# Patient Record
Sex: MALE | Race: WHITE | NOT HISPANIC OR LATINO | Employment: OTHER | URBAN - METROPOLITAN AREA
[De-identification: names, ages, dates, MRNs, and addresses within clinical notes are randomized per-mention and may not be internally consistent; named-entity substitution may affect disease eponyms.]

---

## 2017-01-06 ENCOUNTER — HOSPITAL ENCOUNTER (OUTPATIENT)
Facility: MEDICAL CENTER | Age: 62
End: 2017-01-06
Attending: PAIN MEDICINE | Admitting: PAIN MEDICINE
Payer: COMMERCIAL

## 2017-03-21 ENCOUNTER — HOSPITAL ENCOUNTER (OUTPATIENT)
Dept: LAB | Facility: MEDICAL CENTER | Age: 62
End: 2017-03-21
Attending: ANESTHESIOLOGY
Payer: COMMERCIAL

## 2017-03-21 LAB
ANION GAP SERPL CALC-SCNC: 10 MMOL/L (ref 0–11.9)
APTT PPP: 27.1 SEC (ref 24.7–36)
BUN SERPL-MCNC: 14 MG/DL (ref 8–22)
CALCIUM SERPL-MCNC: 9.7 MG/DL (ref 8.5–10.5)
CHLORIDE SERPL-SCNC: 102 MMOL/L (ref 96–112)
CO2 SERPL-SCNC: 24 MMOL/L (ref 20–33)
CREAT SERPL-MCNC: 0.67 MG/DL (ref 0.5–1.4)
ERYTHROCYTE [DISTWIDTH] IN BLOOD BY AUTOMATED COUNT: 48.9 FL (ref 35.9–50)
GLUCOSE SERPL-MCNC: 100 MG/DL (ref 65–99)
HCT VFR BLD AUTO: 49.1 % (ref 42–52)
HGB BLD-MCNC: 16.7 G/DL (ref 14–18)
INR PPP: 0.86 (ref 0.87–1.13)
MCH RBC QN AUTO: 31.6 PG (ref 27–33)
MCHC RBC AUTO-ENTMCNC: 34 G/DL (ref 33.7–35.3)
MCV RBC AUTO: 93 FL (ref 81.4–97.8)
PLATELET # BLD AUTO: 287 K/UL (ref 164–446)
PMV BLD AUTO: 10.5 FL (ref 9–12.9)
POTASSIUM SERPL-SCNC: 4.1 MMOL/L (ref 3.6–5.5)
PROTHROMBIN TIME: 12 SEC (ref 12–14.6)
RBC # BLD AUTO: 5.28 M/UL (ref 4.7–6.1)
SODIUM SERPL-SCNC: 136 MMOL/L (ref 135–145)
WBC # BLD AUTO: 13.9 K/UL (ref 4.8–10.8)

## 2017-03-21 PROCEDURE — 85027 COMPLETE CBC AUTOMATED: CPT

## 2017-03-21 PROCEDURE — 85730 THROMBOPLASTIN TIME PARTIAL: CPT

## 2017-03-21 PROCEDURE — 85610 PROTHROMBIN TIME: CPT

## 2017-03-21 PROCEDURE — 36415 COLL VENOUS BLD VENIPUNCTURE: CPT

## 2017-03-21 PROCEDURE — 80048 BASIC METABOLIC PNL TOTAL CA: CPT

## 2017-08-01 ENCOUNTER — APPOINTMENT (OUTPATIENT)
Dept: RADIOLOGY | Facility: MEDICAL CENTER | Age: 62
End: 2017-08-01
Attending: NURSE PRACTITIONER
Payer: COMMERCIAL

## 2017-08-09 ENCOUNTER — HOSPITAL ENCOUNTER (OUTPATIENT)
Dept: RADIOLOGY | Facility: MEDICAL CENTER | Age: 62
End: 2017-08-09
Attending: NURSE PRACTITIONER
Payer: COMMERCIAL

## 2017-08-09 DIAGNOSIS — G54.6 PHANTOM PAIN (HCC): ICD-10-CM

## 2017-08-09 PROCEDURE — 72081 X-RAY EXAM ENTIRE SPI 1 VW: CPT

## 2017-10-16 ENCOUNTER — APPOINTMENT (OUTPATIENT)
Dept: RADIOLOGY | Facility: MEDICAL CENTER | Age: 62
DRG: 246 | End: 2017-10-16
Attending: EMERGENCY MEDICINE
Payer: MEDICARE

## 2017-10-16 ENCOUNTER — RESOLUTE PROFESSIONAL BILLING HOSPITAL PROF FEE (OUTPATIENT)
Dept: HOSPITALIST | Facility: MEDICAL CENTER | Age: 62
End: 2017-10-16
Payer: MEDICARE

## 2017-10-16 ENCOUNTER — HOSPITAL ENCOUNTER (INPATIENT)
Facility: MEDICAL CENTER | Age: 62
LOS: 2 days | DRG: 246 | End: 2017-10-18
Attending: EMERGENCY MEDICINE | Admitting: INTERNAL MEDICINE
Payer: MEDICARE

## 2017-10-16 DIAGNOSIS — I21.01 ST ELEVATION MYOCARDIAL INFARCTION INVOLVING LEFT MAIN CORONARY ARTERY (HCC): ICD-10-CM

## 2017-10-16 DIAGNOSIS — I21.09 ANTERIOR MYOCARDIAL INFARCTION (HCC): ICD-10-CM

## 2017-10-16 PROBLEM — Z72.0 NICOTINE ABUSE: Status: ACTIVE | Noted: 2017-10-16

## 2017-10-16 PROBLEM — I21.3 STEMI (ST ELEVATION MYOCARDIAL INFARCTION) (HCC): Status: ACTIVE | Noted: 2017-10-16

## 2017-10-16 PROBLEM — G89.4 CHRONIC PAIN SYNDROME: Status: ACTIVE | Noted: 2017-10-16

## 2017-10-16 PROBLEM — D72.829 LEUCOCYTOSIS: Status: ACTIVE | Noted: 2017-10-16

## 2017-10-16 LAB
ALBUMIN SERPL BCP-MCNC: 3.9 G/DL (ref 3.2–4.9)
ALBUMIN/GLOB SERPL: 1.3 G/DL
ALP SERPL-CCNC: 127 U/L (ref 30–99)
ALT SERPL-CCNC: 14 U/L (ref 2–50)
ANION GAP SERPL CALC-SCNC: 10 MMOL/L (ref 0–11.9)
APTT PPP: 24.6 SEC (ref 24.7–36)
AST SERPL-CCNC: 20 U/L (ref 12–45)
BASOPHILS # BLD AUTO: 0.5 % (ref 0–1.8)
BASOPHILS # BLD: 0.1 K/UL (ref 0–0.12)
BILIRUB SERPL-MCNC: 0.4 MG/DL (ref 0.1–1.5)
BNP SERPL-MCNC: 16 PG/ML (ref 0–100)
BUN SERPL-MCNC: 12 MG/DL (ref 8–22)
CALCIUM SERPL-MCNC: 9 MG/DL (ref 8.5–10.5)
CHLORIDE SERPL-SCNC: 101 MMOL/L (ref 96–112)
CO2 SERPL-SCNC: 25 MMOL/L (ref 20–33)
CREAT SERPL-MCNC: 0.79 MG/DL (ref 0.5–1.4)
EKG IMPRESSION: NORMAL
EKG IMPRESSION: NORMAL
EOSINOPHIL # BLD AUTO: 0.35 K/UL (ref 0–0.51)
EOSINOPHIL NFR BLD: 1.8 % (ref 0–6.9)
ERYTHROCYTE [DISTWIDTH] IN BLOOD BY AUTOMATED COUNT: 46.5 FL (ref 35.9–50)
GFR SERPL CREATININE-BSD FRML MDRD: >60 ML/MIN/1.73 M 2
GLOBULIN SER CALC-MCNC: 3 G/DL (ref 1.9–3.5)
GLUCOSE SERPL-MCNC: 135 MG/DL (ref 65–99)
HCT VFR BLD AUTO: 46.5 % (ref 42–52)
HGB BLD-MCNC: 16.5 G/DL (ref 14–18)
IMM GRANULOCYTES # BLD AUTO: 0.12 K/UL (ref 0–0.11)
IMM GRANULOCYTES NFR BLD AUTO: 0.6 % (ref 0–0.9)
INR PPP: 0.96 (ref 0.87–1.13)
LIPASE SERPL-CCNC: 17 U/L (ref 11–82)
LYMPHOCYTES # BLD AUTO: 2.6 K/UL (ref 1–4.8)
LYMPHOCYTES NFR BLD: 13.3 % (ref 22–41)
MCH RBC QN AUTO: 32.9 PG (ref 27–33)
MCHC RBC AUTO-ENTMCNC: 35.5 G/DL (ref 33.7–35.3)
MCV RBC AUTO: 92.8 FL (ref 81.4–97.8)
MONOCYTES # BLD AUTO: 1.19 K/UL (ref 0–0.85)
MONOCYTES NFR BLD AUTO: 6.1 % (ref 0–13.4)
NEUTROPHILS # BLD AUTO: 15.23 K/UL (ref 1.82–7.42)
NEUTROPHILS NFR BLD: 77.7 % (ref 44–72)
NRBC # BLD AUTO: 0 K/UL
NRBC BLD AUTO-RTO: 0 /100 WBC
PLATELET # BLD AUTO: 288 K/UL (ref 164–446)
PMV BLD AUTO: 10 FL (ref 9–12.9)
POTASSIUM SERPL-SCNC: 3.9 MMOL/L (ref 3.6–5.5)
PROT SERPL-MCNC: 6.9 G/DL (ref 6–8.2)
PROTHROMBIN TIME: 13.1 SEC (ref 12–14.6)
RBC # BLD AUTO: 5.01 M/UL (ref 4.7–6.1)
SODIUM SERPL-SCNC: 136 MMOL/L (ref 135–145)
TROPONIN I SERPL-MCNC: 0.03 NG/ML (ref 0–0.04)
TROPONIN I SERPL-MCNC: 13.61 NG/ML (ref 0–0.04)
WBC # BLD AUTO: 19.6 K/UL (ref 4.8–10.8)

## 2017-10-16 PROCEDURE — C1894 INTRO/SHEATH, NON-LASER: HCPCS

## 2017-10-16 PROCEDURE — 93458 L HRT ARTERY/VENTRICLE ANGIO: CPT

## 2017-10-16 PROCEDURE — 85025 COMPLETE CBC W/AUTO DIFF WBC: CPT

## 2017-10-16 PROCEDURE — 80053 COMPREHEN METABOLIC PANEL: CPT

## 2017-10-16 PROCEDURE — 99291 CRITICAL CARE FIRST HOUR: CPT

## 2017-10-16 PROCEDURE — 99152 MOD SED SAME PHYS/QHP 5/>YRS: CPT

## 2017-10-16 PROCEDURE — 770022 HCHG ROOM/CARE - ICU (200)

## 2017-10-16 PROCEDURE — 84484 ASSAY OF TROPONIN QUANT: CPT

## 2017-10-16 PROCEDURE — 700111 HCHG RX REV CODE 636 W/ 250 OVERRIDE (IP)

## 2017-10-16 PROCEDURE — 700101 HCHG RX REV CODE 250

## 2017-10-16 PROCEDURE — 700117 HCHG RX CONTRAST REV CODE 255: Performed by: INTERNAL MEDICINE

## 2017-10-16 PROCEDURE — 304952 HCHG R 2 PADS

## 2017-10-16 PROCEDURE — 93306 TTE W/DOPPLER COMPLETE: CPT | Mod: 26 | Performed by: INTERNAL MEDICINE

## 2017-10-16 PROCEDURE — C1725 CATH, TRANSLUMIN NON-LASER: HCPCS

## 2017-10-16 PROCEDURE — A9270 NON-COVERED ITEM OR SERVICE: HCPCS

## 2017-10-16 PROCEDURE — 96375 TX/PRO/DX INJ NEW DRUG ADDON: CPT

## 2017-10-16 PROCEDURE — B2151ZZ FLUOROSCOPY OF LEFT HEART USING LOW OSMOLAR CONTRAST: ICD-10-PCS | Performed by: INTERNAL MEDICINE

## 2017-10-16 PROCEDURE — 700111 HCHG RX REV CODE 636 W/ 250 OVERRIDE (IP): Performed by: INTERNAL MEDICINE

## 2017-10-16 PROCEDURE — 99153 MOD SED SAME PHYS/QHP EA: CPT

## 2017-10-16 PROCEDURE — 360979 HCHG DIAGNOSTIC CATH

## 2017-10-16 PROCEDURE — C1887 CATHETER, GUIDING: HCPCS

## 2017-10-16 PROCEDURE — 96374 THER/PROPH/DIAG INJ IV PUSH: CPT

## 2017-10-16 PROCEDURE — 94640 AIRWAY INHALATION TREATMENT: CPT

## 2017-10-16 PROCEDURE — A9270 NON-COVERED ITEM OR SERVICE: HCPCS | Performed by: INTERNAL MEDICINE

## 2017-10-16 PROCEDURE — 700102 HCHG RX REV CODE 250 W/ 637 OVERRIDE(OP): Performed by: HOSPITALIST

## 2017-10-16 PROCEDURE — 93005 ELECTROCARDIOGRAM TRACING: CPT | Performed by: INTERNAL MEDICINE

## 2017-10-16 PROCEDURE — 36415 COLL VENOUS BLD VENIPUNCTURE: CPT

## 2017-10-16 PROCEDURE — C1874 STENT, COATED/COV W/DEL SYS: HCPCS

## 2017-10-16 PROCEDURE — 85730 THROMBOPLASTIN TIME PARTIAL: CPT

## 2017-10-16 PROCEDURE — A9270 NON-COVERED ITEM OR SERVICE: HCPCS | Performed by: HOSPITALIST

## 2017-10-16 PROCEDURE — 93306 TTE W/DOPPLER COMPLETE: CPT

## 2017-10-16 PROCEDURE — 700102 HCHG RX REV CODE 250 W/ 637 OVERRIDE(OP)

## 2017-10-16 PROCEDURE — 83880 ASSAY OF NATRIURETIC PEPTIDE: CPT

## 2017-10-16 PROCEDURE — 700105 HCHG RX REV CODE 258: Performed by: INTERNAL MEDICINE

## 2017-10-16 PROCEDURE — 307093 HCHG TR BAND RADIAL

## 2017-10-16 PROCEDURE — 4A023N7 MEASUREMENT OF CARDIAC SAMPLING AND PRESSURE, LEFT HEART, PERCUTANEOUS APPROACH: ICD-10-PCS | Performed by: INTERNAL MEDICINE

## 2017-10-16 PROCEDURE — 71010 DX-CHEST-LIMITED (1 VIEW): CPT

## 2017-10-16 PROCEDURE — 99223 1ST HOSP IP/OBS HIGH 75: CPT | Mod: AI | Performed by: INTERNAL MEDICINE

## 2017-10-16 PROCEDURE — 93005 ELECTROCARDIOGRAM TRACING: CPT | Performed by: EMERGENCY MEDICINE

## 2017-10-16 PROCEDURE — 93010 ELECTROCARDIOGRAM REPORT: CPT | Performed by: INTERNAL MEDICINE

## 2017-10-16 PROCEDURE — 700105 HCHG RX REV CODE 258

## 2017-10-16 PROCEDURE — 85610 PROTHROMBIN TIME: CPT

## 2017-10-16 PROCEDURE — 94760 N-INVAS EAR/PLS OXIMETRY 1: CPT

## 2017-10-16 PROCEDURE — C1769 GUIDE WIRE: HCPCS

## 2017-10-16 PROCEDURE — C9606 PERC D-E COR REVASC W AMI S: HCPCS | Mod: LD

## 2017-10-16 PROCEDURE — 0270346 DILATION OF CORONARY ARTERY, ONE ARTERY, BIFURCATION, WITH DRUG-ELUTING INTRALUMINAL DEVICE, PERCUTANEOUS APPROACH: ICD-10-PCS | Performed by: INTERNAL MEDICINE

## 2017-10-16 PROCEDURE — B2111ZZ FLUOROSCOPY OF MULTIPLE CORONARY ARTERIES USING LOW OSMOLAR CONTRAST: ICD-10-PCS | Performed by: INTERNAL MEDICINE

## 2017-10-16 PROCEDURE — 83690 ASSAY OF LIPASE: CPT

## 2017-10-16 PROCEDURE — 700102 HCHG RX REV CODE 250 W/ 637 OVERRIDE(OP): Performed by: INTERNAL MEDICINE

## 2017-10-16 RX ORDER — HEPARIN SODIUM,PORCINE 1000/ML
VIAL (ML) INJECTION
Status: COMPLETED
Start: 2017-10-16 | End: 2017-10-16

## 2017-10-16 RX ORDER — ONDANSETRON 2 MG/ML
INJECTION INTRAMUSCULAR; INTRAVENOUS
Status: COMPLETED
Start: 2017-10-16 | End: 2017-10-16

## 2017-10-16 RX ORDER — MORPHINE SULFATE 4 MG/ML
INJECTION, SOLUTION INTRAMUSCULAR; INTRAVENOUS
Status: COMPLETED
Start: 2017-10-16 | End: 2017-10-16

## 2017-10-16 RX ORDER — ONDANSETRON 4 MG/1
4 TABLET, ORALLY DISINTEGRATING ORAL EVERY 4 HOURS PRN
Status: DISCONTINUED | OUTPATIENT
Start: 2017-10-16 | End: 2017-10-18 | Stop reason: HOSPADM

## 2017-10-16 RX ORDER — MIDAZOLAM HYDROCHLORIDE 1 MG/ML
INJECTION INTRAMUSCULAR; INTRAVENOUS
Status: COMPLETED
Start: 2017-10-16 | End: 2017-10-16

## 2017-10-16 RX ORDER — NITROGLYCERIN 0.4 MG/1
0.4 TABLET SUBLINGUAL
Status: DISCONTINUED | OUTPATIENT
Start: 2017-10-16 | End: 2017-10-18 | Stop reason: HOSPADM

## 2017-10-16 RX ORDER — SODIUM CHLORIDE 9 MG/ML
INJECTION, SOLUTION INTRAVENOUS CONTINUOUS
Status: DISCONTINUED | OUTPATIENT
Start: 2017-10-16 | End: 2017-10-16

## 2017-10-16 RX ORDER — MORPHINE SULFATE 4 MG/ML
2-4 INJECTION, SOLUTION INTRAMUSCULAR; INTRAVENOUS
Status: DISCONTINUED | OUTPATIENT
Start: 2017-10-16 | End: 2017-10-18 | Stop reason: HOSPADM

## 2017-10-16 RX ORDER — ASPIRIN 300 MG/1
300 SUPPOSITORY RECTAL DAILY
Status: DISCONTINUED | OUTPATIENT
Start: 2017-10-16 | End: 2017-10-18

## 2017-10-16 RX ORDER — PRASUGREL 10 MG/1
TABLET, FILM COATED ORAL
Status: COMPLETED
Start: 2017-10-16 | End: 2017-10-16

## 2017-10-16 RX ORDER — ASPIRIN 325 MG
325 TABLET ORAL DAILY
Status: DISCONTINUED | OUTPATIENT
Start: 2017-10-16 | End: 2017-10-18

## 2017-10-16 RX ORDER — MORPHINE SULFATE 15 MG/1
15 TABLET, FILM COATED, EXTENDED RELEASE ORAL
Status: DISCONTINUED | OUTPATIENT
Start: 2017-10-16 | End: 2017-10-18 | Stop reason: HOSPADM

## 2017-10-16 RX ORDER — AMIODARONE HYDROCHLORIDE 150 MG/3ML
INJECTION, SOLUTION INTRAVENOUS
Status: COMPLETED
Start: 2017-10-16 | End: 2017-10-16

## 2017-10-16 RX ORDER — CARVEDILOL 6.25 MG/1
6.25 TABLET ORAL 2 TIMES DAILY WITH MEALS
Status: DISCONTINUED | OUTPATIENT
Start: 2017-10-16 | End: 2017-10-18 | Stop reason: HOSPADM

## 2017-10-16 RX ORDER — HEPARIN SODIUM 5000 [USP'U]/ML
5000 INJECTION, SOLUTION INTRAVENOUS; SUBCUTANEOUS EVERY 8 HOURS
Status: DISCONTINUED | OUTPATIENT
Start: 2017-10-16 | End: 2017-10-18 | Stop reason: HOSPADM

## 2017-10-16 RX ORDER — NICOTINE 21 MG/24HR
21 PATCH, TRANSDERMAL 24 HOURS TRANSDERMAL
Status: DISCONTINUED | OUTPATIENT
Start: 2017-10-16 | End: 2017-10-18 | Stop reason: HOSPADM

## 2017-10-16 RX ORDER — PROMETHAZINE HYDROCHLORIDE 12.5 MG/1
12.5-25 SUPPOSITORY RECTAL EVERY 4 HOURS PRN
Status: DISCONTINUED | OUTPATIENT
Start: 2017-10-16 | End: 2017-10-18 | Stop reason: HOSPADM

## 2017-10-16 RX ORDER — ONDANSETRON 2 MG/ML
4 INJECTION INTRAMUSCULAR; INTRAVENOUS EVERY 4 HOURS PRN
Status: DISCONTINUED | OUTPATIENT
Start: 2017-10-16 | End: 2017-10-16

## 2017-10-16 RX ORDER — METOPROLOL SUCCINATE 50 MG/1
50 TABLET, EXTENDED RELEASE ORAL
Status: CANCELLED | OUTPATIENT
Start: 2017-10-16

## 2017-10-16 RX ORDER — BUDESONIDE AND FORMOTEROL FUMARATE DIHYDRATE 160; 4.5 UG/1; UG/1
2 AEROSOL RESPIRATORY (INHALATION) 2 TIMES DAILY
Status: DISCONTINUED | OUTPATIENT
Start: 2017-10-16 | End: 2017-10-18 | Stop reason: HOSPADM

## 2017-10-16 RX ORDER — VERAPAMIL HYDROCHLORIDE 2.5 MG/ML
INJECTION, SOLUTION INTRAVENOUS
Status: COMPLETED
Start: 2017-10-16 | End: 2017-10-16

## 2017-10-16 RX ORDER — PRASUGREL 10 MG/1
10 TABLET, FILM COATED ORAL DAILY
Status: DISCONTINUED | OUTPATIENT
Start: 2017-10-17 | End: 2017-10-18 | Stop reason: HOSPADM

## 2017-10-16 RX ORDER — MORPHINE SULFATE 4 MG/ML
4 INJECTION, SOLUTION INTRAMUSCULAR; INTRAVENOUS ONCE
Status: COMPLETED | OUTPATIENT
Start: 2017-10-16 | End: 2017-10-16

## 2017-10-16 RX ORDER — OXYCODONE HYDROCHLORIDE 5 MG/1
5 TABLET ORAL EVERY 4 HOURS PRN
Status: DISCONTINUED | OUTPATIENT
Start: 2017-10-16 | End: 2017-10-16

## 2017-10-16 RX ORDER — ATORVASTATIN CALCIUM 20 MG/1
40 TABLET, FILM COATED ORAL EVERY EVENING
Status: CANCELLED | OUTPATIENT
Start: 2017-10-16

## 2017-10-16 RX ORDER — ACETAMINOPHEN 325 MG/1
650 TABLET ORAL EVERY 6 HOURS PRN
Status: DISCONTINUED | OUTPATIENT
Start: 2017-10-16 | End: 2017-10-18 | Stop reason: HOSPADM

## 2017-10-16 RX ORDER — OXYCODONE HYDROCHLORIDE 10 MG/1
10 TABLET ORAL EVERY 4 HOURS PRN
Status: DISCONTINUED | OUTPATIENT
Start: 2017-10-16 | End: 2017-10-18 | Stop reason: HOSPADM

## 2017-10-16 RX ORDER — BISACODYL 10 MG
10 SUPPOSITORY, RECTAL RECTAL
Status: DISCONTINUED | OUTPATIENT
Start: 2017-10-16 | End: 2017-10-18 | Stop reason: HOSPADM

## 2017-10-16 RX ORDER — NITROGLYCERIN 0.4 MG/1
TABLET SUBLINGUAL
Status: COMPLETED
Start: 2017-10-16 | End: 2017-10-16

## 2017-10-16 RX ORDER — ATORVASTATIN CALCIUM 20 MG/1
80 TABLET, FILM COATED ORAL NIGHTLY
Status: DISCONTINUED | OUTPATIENT
Start: 2017-10-16 | End: 2017-10-18 | Stop reason: HOSPADM

## 2017-10-16 RX ORDER — SPIRONOLACTONE 25 MG/1
12.5 TABLET ORAL
Status: DISCONTINUED | OUTPATIENT
Start: 2017-10-16 | End: 2017-10-18 | Stop reason: HOSPADM

## 2017-10-16 RX ORDER — ASPIRIN 81 MG/1
324 TABLET, CHEWABLE ORAL DAILY
Status: DISCONTINUED | OUTPATIENT
Start: 2017-10-16 | End: 2017-10-18

## 2017-10-16 RX ORDER — SODIUM CHLORIDE 9 MG/ML
INJECTION, SOLUTION INTRAVENOUS CONTINUOUS
Status: DISCONTINUED | OUTPATIENT
Start: 2017-10-16 | End: 2017-10-18 | Stop reason: HOSPADM

## 2017-10-16 RX ORDER — ALBUTEROL SULFATE 90 UG/1
2 AEROSOL, METERED RESPIRATORY (INHALATION) EVERY 4 HOURS PRN
Status: DISCONTINUED | OUTPATIENT
Start: 2017-10-16 | End: 2017-10-18 | Stop reason: HOSPADM

## 2017-10-16 RX ORDER — PRASUGREL 10 MG/1
60 TABLET, FILM COATED ORAL ONCE
Status: COMPLETED | OUTPATIENT
Start: 2017-10-16 | End: 2017-10-16

## 2017-10-16 RX ORDER — AMOXICILLIN 250 MG
2 CAPSULE ORAL 2 TIMES DAILY
Status: DISCONTINUED | OUTPATIENT
Start: 2017-10-16 | End: 2017-10-18 | Stop reason: HOSPADM

## 2017-10-16 RX ORDER — POLYETHYLENE GLYCOL 3350 17 G/17G
1 POWDER, FOR SOLUTION ORAL
Status: DISCONTINUED | OUTPATIENT
Start: 2017-10-16 | End: 2017-10-18 | Stop reason: HOSPADM

## 2017-10-16 RX ORDER — ONDANSETRON 2 MG/ML
4 INJECTION INTRAMUSCULAR; INTRAVENOUS EVERY 4 HOURS PRN
Status: DISCONTINUED | OUTPATIENT
Start: 2017-10-16 | End: 2017-10-18 | Stop reason: HOSPADM

## 2017-10-16 RX ORDER — PROMETHAZINE HYDROCHLORIDE 25 MG/1
12.5-25 TABLET ORAL EVERY 4 HOURS PRN
Status: DISCONTINUED | OUTPATIENT
Start: 2017-10-16 | End: 2017-10-18 | Stop reason: HOSPADM

## 2017-10-16 RX ORDER — LIDOCAINE HYDROCHLORIDE 20 MG/ML
INJECTION, SOLUTION INFILTRATION; PERINEURAL
Status: COMPLETED
Start: 2017-10-16 | End: 2017-10-16

## 2017-10-16 RX ADMIN — NICOTINE 21 MG: 21 PATCH, EXTENDED RELEASE TRANSDERMAL at 11:18

## 2017-10-16 RX ADMIN — MIDAZOLAM 2 MG: 1 INJECTION INTRAMUSCULAR; INTRAVENOUS at 10:03

## 2017-10-16 RX ADMIN — ATORVASTATIN CALCIUM 80 MG: 20 TABLET, FILM COATED ORAL at 21:15

## 2017-10-16 RX ADMIN — MORPHINE SULFATE 15 MG: 15 TABLET, FILM COATED, EXTENDED RELEASE ORAL at 21:15

## 2017-10-16 RX ADMIN — NITROGLYCERIN 10 ML: 20 INJECTION INTRAVENOUS at 10:01

## 2017-10-16 RX ADMIN — OXYCODONE HYDROCHLORIDE 10 MG: 10 TABLET ORAL at 18:05

## 2017-10-16 RX ADMIN — AMIODARONE HYDROCHLORIDE 150 MG: 50 INJECTION, SOLUTION INTRAVENOUS at 10:04

## 2017-10-16 RX ADMIN — BIVALIRUDIN 1.75 MG/KG/HR: 250 INJECTION, POWDER, LYOPHILIZED, FOR SOLUTION INTRAVENOUS at 10:15

## 2017-10-16 RX ADMIN — HEPARIN SODIUM 5000 UNITS: 5000 INJECTION, SOLUTION INTRAVENOUS; SUBCUTANEOUS at 14:28

## 2017-10-16 RX ADMIN — Medication 60 MG: at 10:13

## 2017-10-16 RX ADMIN — NITROGLYCERIN 0.4 MG: 0.4 TABLET SUBLINGUAL at 09:20

## 2017-10-16 RX ADMIN — HEPARIN SODIUM: 1000 INJECTION, SOLUTION INTRAVENOUS; SUBCUTANEOUS at 10:01

## 2017-10-16 RX ADMIN — VERAPAMIL HYDROCHLORIDE 2.5 MG: 2.5 INJECTION, SOLUTION INTRAVENOUS at 10:02

## 2017-10-16 RX ADMIN — MORPHINE SULFATE 4 MG: 4 INJECTION, SOLUTION INTRAMUSCULAR; INTRAVENOUS at 09:34

## 2017-10-16 RX ADMIN — FENTANYL CITRATE 100 MCG: 50 INJECTION, SOLUTION INTRAMUSCULAR; INTRAVENOUS at 10:03

## 2017-10-16 RX ADMIN — ALBUTEROL SULFATE 2.5 MG: 2.5 SOLUTION RESPIRATORY (INHALATION) at 17:10

## 2017-10-16 RX ADMIN — LIDOCAINE HYDROCHLORIDE: 20 INJECTION, SOLUTION INFILTRATION; PERINEURAL at 10:02

## 2017-10-16 RX ADMIN — HEPARIN SODIUM 2000 UNITS: 200 INJECTION, SOLUTION INTRAVENOUS at 10:02

## 2017-10-16 RX ADMIN — HEPARIN SODIUM 5000 UNITS: 5000 INJECTION, SOLUTION INTRAVENOUS; SUBCUTANEOUS at 21:15

## 2017-10-16 RX ADMIN — IOHEXOL 129 ML: 350 INJECTION, SOLUTION INTRAVENOUS at 10:12

## 2017-10-16 RX ADMIN — FENTANYL CITRATE 25 MCG: 50 INJECTION, SOLUTION INTRAMUSCULAR; INTRAVENOUS at 10:06

## 2017-10-16 RX ADMIN — PRASUGREL 60 MG: 10 TABLET, FILM COATED ORAL at 10:13

## 2017-10-16 RX ADMIN — ONDANSETRON 4 MG: 2 INJECTION INTRAMUSCULAR; INTRAVENOUS at 09:20

## 2017-10-16 RX ADMIN — OXYCODONE HYDROCHLORIDE 10 MG: 10 TABLET ORAL at 12:02

## 2017-10-16 RX ADMIN — SODIUM CHLORIDE: 9 INJECTION, SOLUTION INTRAVENOUS at 11:13

## 2017-10-16 RX ADMIN — MORPHINE SULFATE 4 MG: 4 INJECTION INTRAVENOUS at 09:34

## 2017-10-16 RX ADMIN — STANDARDIZED SENNA CONCENTRATE AND DOCUSATE SODIUM 2 TABLET: 8.6; 5 TABLET, FILM COATED ORAL at 21:16

## 2017-10-16 RX ADMIN — MORPHINE SULFATE 4 MG: 4 INJECTION INTRAVENOUS at 09:20

## 2017-10-16 ASSESSMENT — PATIENT HEALTH QUESTIONNAIRE - PHQ9
2. FEELING DOWN, DEPRESSED, IRRITABLE, OR HOPELESS: NOT AT ALL
1. LITTLE INTEREST OR PLEASURE IN DOING THINGS: NOT AT ALL
SUM OF ALL RESPONSES TO PHQ QUESTIONS 1-9: 0
SUM OF ALL RESPONSES TO PHQ9 QUESTIONS 1 AND 2: 0
SUM OF ALL RESPONSES TO PHQ QUESTIONS 1-9: 0
SUM OF ALL RESPONSES TO PHQ9 QUESTIONS 1 AND 2: 0
2. FEELING DOWN, DEPRESSED, IRRITABLE, OR HOPELESS: NOT AT ALL
1. LITTLE INTEREST OR PLEASURE IN DOING THINGS: NOT AT ALL

## 2017-10-16 ASSESSMENT — ENCOUNTER SYMPTOMS
HEADACHES: 0
NERVOUS/ANXIOUS: 0
COUGH: 0
STRIDOR: 0
SHORTNESS OF BREATH: 0
FEVER: 0
ORTHOPNEA: 0
INSOMNIA: 0
BACK PAIN: 0
EYE PAIN: 0
ABDOMINAL PAIN: 0
PALPITATIONS: 0
SPUTUM PRODUCTION: 0
FOCAL WEAKNESS: 0
WEIGHT LOSS: 0
NAUSEA: 1
EYE DISCHARGE: 0
VOMITING: 0
DEPRESSION: 0
NECK PAIN: 0
DIARRHEA: 0
BLURRED VISION: 0
MYALGIAS: 0
DIZZINESS: 0
SEIZURES: 0
HEARTBURN: 0
CHILLS: 0
DIAPHORESIS: 1
EYE REDNESS: 0

## 2017-10-16 ASSESSMENT — COPD QUESTIONNAIRES
DO YOU EVER COUGH UP ANY MUCUS OR PHLEGM?: YES, A FEW DAYS A WEEK OR MONTH
COPD SCREENING SCORE: 7
COPD SCREENING SCORE: 7
DO YOU EVER COUGH UP ANY MUCUS OR PHLEGM?: YES, A FEW DAYS A WEEK OR MONTH
HAVE YOU SMOKED AT LEAST 100 CIGARETTES IN YOUR ENTIRE LIFE: YES
HAVE YOU SMOKED AT LEAST 100 CIGARETTES IN YOUR ENTIRE LIFE: YES
DURING THE PAST 4 WEEKS HOW MUCH DID YOU FEEL SHORT OF BREATH: SOME OF THE TIME
DURING THE PAST 4 WEEKS HOW MUCH DID YOU FEEL SHORT OF BREATH: SOME OF THE TIME

## 2017-10-16 ASSESSMENT — LIFESTYLE VARIABLES
ALCOHOL_USE: NO
EVER_SMOKED: YES
EVER_SMOKED: YES

## 2017-10-16 ASSESSMENT — PAIN SCALES - GENERAL
PAINLEVEL_OUTOF10: 9
PAINLEVEL_OUTOF10: 7
PAINLEVEL_OUTOF10: 0
PAINLEVEL_OUTOF10: 6
PAINLEVEL_OUTOF10: 3
PAINLEVEL_OUTOF10: 5
PAINLEVEL_OUTOF10: 6
PAINLEVEL_OUTOF10: 4
PAINLEVEL_OUTOF10: 3

## 2017-10-16 NOTE — PROGRESS NOTES
Karina from Lab called with critical result of troponin of 13.61 at 1400. Critical lab result read back to Karina.   MD aware, patient s/p cath lab. Patient resting comfortably in bed with no complaints of chest pain at this time. HR 60, /49, 97% 2L NC.

## 2017-10-16 NOTE — ED NOTES
0935 pt in route to cath lab 1. Report given to [facility] gentry badillo. Pt shaved, placed on monitor 2nd iv started, labs collected and sent. Pt medicated prior to leaving.

## 2017-10-16 NOTE — PROCEDURES
REFERRING PHYSICIAN: Dr. Taylor    PREOPERATIVE DIAGNOSIS:  1. Anterior ST elevation myocardial infarction  2. CAD status post multiple PCI in the past  3. Ischemic cardiomyopathy, previous LVEF unknown    POSTOPERATIVE DIAGNOSIS:  1. LVEF33%  2. 99.9% culprit pLAD-D1 (Paz 1,1,1) stenosis, thrombus   3. 60% focal distal RCA stenosis, I asked our  4. Successful PCI LAD 3.5x18mm Xience Alpine ALLA, provisional D1, TIMI3 flow all vessels      PROCEDURE PERFORMED:  Selective coronary angiography of the native vessels  Left heart catheterization  Left ventriculogram  PCI of LAD ALLA    DESCRIPTION OF PROCEDURE:  The risks and benefits of cardiac catheterization as well as the procedure itself, rationale and appropriateness were discussed with the patient today. Complications including but not limited to death, stroke, MI, urgent bypass surgery, contrast nephropathy, vascular complications, bleeding and infection were explained to the patient. The potential outcomes associated with the procedure (possible PCI, possible CABG, possible medical Rx only) were also discussed at length. The patient agreed to proceed.    The patient was transported to the catheterization laboratory in the fasting state. The right radial area and right groin were prepped and draped in the usual fashion. Right radial area was entered with a single through and through puncture and a 6F glide sheath was placed. An intra-arterial cocktail of heparin, verapamil and nitroglycerine was administered. Over a wire, a 6 Lebanese JR4 catheter was passed to the aortic root and used to engage the right coronary artery Contrast was administered and multiple images obtained. This catheter was then used to cross the aortic valve for LHC and contrast was administered at 8cc/s for 24cc's for left ventriculogram. This was exchanged over wire for a 6 Lebanese EBU 3.5 guiding catheter was seated in the left main. Multiple images were obtained.     DESCRIPTION OF  PCI:  The decision was made to intervene on the culprit coronary artery. Bivalirudin bolus and infusion was initiated. A BMW 0.014 floppy tip wire was navigated across the culprit stenos and parked in the distal LAD, followed by a 2nd 0.014 run through wire navigated into the side branch. Over the LAD wire, A 3.0 x 12 mm compliant balloon balloon was used to predilate the lesion. A 3.5 x 15 mm compliant balloon was then used to predilate the lesion further. A 3.5 x 18 mm Xience Alpine ALLA was then positioned and deployed at nominal pressure. Following this a 3.5 x 15 mm NC trek was used to post dilate the stent. Sidebranch wire was removed. CEDRICK-3 flow was restored to all vessels. There was a 80% angiographic ostial stenosis due to jailing the diagonal but residual CEDRICK-3 flow. There was an excellent angiographic result with CEDRICK-3 flow and no residual stenosis in the stented segment. All catheters and guidewires were removed and the patient left the cath lab in stable condition.      FINDINGS:  I. HEMODYNAMICS:    Ao: 155/75 mmHg   LEDP: 35 mmHg   Gradient on LV pullback: No    II. LEFT VENTRICULOGRAM:   LVEF ROSENBERG PROJECTION: 33%     III. CORONARY ANGIOGRAPHY:  Left Main: Moderate size, short, bifurcating no CAD.  Left Anterior Descending: Large with an area of acute stent thrombosis in the proximal vessel involving the 1st diagonal (Paz 1, 1, 1) with significant thrombus and plaque. Postintervention there is 0% residual stenosis in the LAD and pinching of the sidebranch with restoration of CEDRICK-3 flow to all involved vessels.  Left Circumflex: Moderate size nondominant with a widely patent stent in the proximal portion. There is a very large 1st obtuse marginal/ramus which is widely patent.  Right Coronary: Widely patent proximal stent and a dominant vessel. Large in size. There is a focal area of distal in-stent restenosis of 60% with CEDRICK-3 flow.     COMPLICATIONS: none apparent    CONCLUSIONS:  1. LVEF33%  2.  99.9% culprit pLAD-D1 (Paz 1,1,1) stenosis, thrombus   3. 60% focal distal RCA stenosis, I asked our  4. Successful PCI LAD 3.5x18mm Xience Alpine ALLA, provisional D1, TIMI3 flow all vessels

## 2017-10-16 NOTE — H&P
" Hospital Medicine History and Physical      Date of Service  10/16/2017    Chief Complaint  Chief Complaint   Patient presents with   • Chest Pain       History of Presenting Illness  Dedual is a 62 y.o. male PMH of CAD post 4 stents, DLD, HTN who presents with chest pain started around 8:30 am. Over the past 3 weeks, he has been having intermittent chest pain. He continues to smoke 1PPD. But this morning, the pain is so severe that he rated it as 11/10, pressure like, radiated to his left arm and neck area, associated with nausea and diaphoresis. In the ER he was found to have STEMI on ekg. Cardiology was consulted and he was immediately taken to cath lab. He will be admitted to cardiac ICU after the cath.    Primary Care Physician  KATELYN Clark.      Code Status  Full code    Review of Systems  Review of Systems   Constitutional: Positive for diaphoresis. Negative for chills, fever and weight loss.   HENT: Negative for congestion and nosebleeds.    Eyes: Negative for blurred vision, pain, discharge and redness.   Respiratory: Negative for cough, sputum production, shortness of breath and stridor.    Cardiovascular: Positive for chest pain. Negative for palpitations and orthopnea.   Gastrointestinal: Positive for nausea. Negative for abdominal pain, diarrhea, heartburn and vomiting.   Genitourinary: Negative for dysuria, frequency and urgency.   Musculoskeletal: Negative for back pain, myalgias and neck pain.   Skin: Negative for itching and rash.   Neurological: Negative for dizziness, focal weakness, seizures and headaches.   Psychiatric/Behavioral: Negative for depression. The patient is not nervous/anxious and does not have insomnia.      Please see HPI, all other systems were reviewed and are negative (AMA/CMS criteria)     Past Medical History  Past Medical History:   Diagnosis Date   • Asthma    • Bipolar disorder (CMS-HCC)     no meds   • Chronic back pain     6-7/10 when walking   • Cold     \"I " "had a cold 12/8/16 , felt really bad over the weekend, feeling better now\" denies sob, did have a productive cough   • Dental disorder     missing teeth, currently on antibx   • Diverticulitis    • Hemorrhagic disorder (CMS-HCC)     on Plavix   • High cholesterol    • History of back surgery    • Hypertension    • Infectious disease    • MI (myocardial infarction) (CMS-HCC) 2007,01/2015, 02/2015    w/ stents   • Pneumonia     2013   • Sleep apnea     uses CPAP   • Snoring        Surgical History  Past Surgical History:   Procedure Laterality Date   • EXTREME LATERAL INTERBODY FUSION  1/19/2016    Procedure: EXTREME LATERAL INTERBODY FUSION XLIF L1-2 (STAGE 1);  Surgeon: Ricky Jordan M.D.;  Location: SURGERY [facility];  Service:    • LUMBAR LAMINECTOMY DISKECTOMY  1/19/2016    Procedure: LUMBAR LAMINECTOMY DISKECTOMY FOR REDO LAMINOTOMIES L1-2 (STAGE 2);  Surgeon: Ricky Jordan M.D.;  Location: SURGERY [facility];  Service:    • OTHER CARDIAC SURGERY  02/2015    stent placement   • APPENDECTOMY     • OTHER ABDOMINAL SURGERY      colon resection    • OTHER NEUROLOGICAL SURG      x7 lumbar surgs       Medications  No current facility-administered medications on file prior to encounter.      Current Outpatient Prescriptions on File Prior to Encounter   Medication Sig Dispense Refill   • atorvastatin (LIPITOR) 80 MG tablet Take 80 mg by mouth every evening.     • Omega 3 1000 MG Cap Take 1,000 mg by mouth every day.     • oxycodone, immediate release, (ROXICODONE) 5 MG TABS Take 1 Tab by mouth every four hours as needed ((4-6)). (Patient taking differently: Take 5 mg by mouth every 8 hours as needed ((4-6)).) 16 Tab 0   • metoprolol SR (TOPROL XL) 25 MG TB24 Take 2 Tabs by mouth every bedtime. (Patient taking differently: Take 50 mg by mouth 2 Times a Day.) 10 Tab 0   • cholecalciferol (VITAMIN D3) 5000 UNIT CAPS Take 10,000 Units by mouth every day.     • Multiple Vitamins-Minerals (MULTIVITAMIN & MINERAL " PO) Take 1 Tab by mouth every day.       Family History  No family history on file.  No pertinent family history    Social History  Social History   Substance Use Topics   • Smoking status: Former Smoker     Packs/day: 1.00     Years: 30.00     Types: Cigarettes     Quit date: 2015   • Smokeless tobacco: Never Used   • Alcohol use No       Allergies  No Known Allergies     Physical Exam  Laboratory   Hemodynamics  Temp (24hrs), Av.6 °C (97.9 °F), Min:36.6 °C (97.9 °F), Max:36.6 °C (97.9 °F)   Temperature: 36.6 °C (97.9 °F)  Pulse  Av  Min: 93  Max: 93    Blood Pressure: 129/90      Respiratory      Respiration: 20             Physical Exam   Constitutional: He is oriented to person, place, and time. No distress.   HENT:   Head: Normocephalic and atraumatic.   Mouth/Throat: Oropharynx is clear and moist.   Eyes: Conjunctivae and EOM are normal. Pupils are equal, round, and reactive to light.   Neck: Normal range of motion. Neck supple. No tracheal deviation present. No thyromegaly present.   Cardiovascular: Normal rate and regular rhythm.    No murmur heard.  Pulmonary/Chest: Effort normal and breath sounds normal. No respiratory distress. He has no wheezes.   Abdominal: Soft. Bowel sounds are normal. He exhibits no distension. There is no tenderness.   Musculoskeletal: He exhibits no edema or tenderness.   Neurological: He is alert and oriented to person, place, and time. No cranial nerve deficit.   Skin: Skin is warm and dry. He is not diaphoretic. No erythema.   Psychiatric: He has a normal mood and affect. His behavior is normal. Thought content normal.       Recent Labs      10/16/17   0921   WBC  19.6*   RBC  5.01   HEMOGLOBIN  16.5   HEMATOCRIT  46.5   MCV  92.8   MCH  32.9   MCHC  35.5*   RDW  46.5   PLATELETCT  288   MPV  10.0         No results for input(s): ALTSGPT, ASTSGOT, ALKPHOSPHAT, TBILIRUBIN, DBILIRUBIN, GAMMAGT, AMYLASE, LIPASE, ALB, PREALBUMIN, GLUCOSE in the last 72 hours.               Lab Results   Component Value Date    TROPONINI <0.01 01/21/2016       Imaging  DX-CHEST-LIMITED (1 VIEW)    (Results Pending)   ECHOCARDIOGRAM COMP W/O CONT    (Results Pending)     EKG  per my independant read:  QTc: 436, HR: 90, Normal Sinus Rhythm, ST elevation on I, AVL, V1-V3  ST depression on II, III, AVF     Assessment/Plan     I anticipate this patient will require at least two midnights for appropriate medical management, necessitating inpatient admission.    STEMI (ST elevation myocardial infarction) (CMS-[facility])- (present on admission)   Assessment & Plan    History of stents in the past  Ongoing smoker  cardiac cath:   1. LVEF33%  2. 99.9% culprit pLAD-D1 (Paz 1,1,1) stenosis, thrombus   3. Successful PCI LAD 3.5x18mm Xience Alpine ALLA, provisional D1, TIMI3 flow all vessels  Card: on  Asa, effient, statin, bblocker  Echo: pending        Chronic pain syndrome- (present on admission)   Assessment & Plan    Continue outpatient meds        Leucocytosis- (present on admission)   Assessment & Plan    Likely reactive  Follow cbc in am        Nicotine abuse- (present on admission)   Assessment & Plan    Smoking cessation education was given  Nicotine patch            Prophylaxis:  sc heparin  Critical care time spent 38 minutes, with no overlap, exclude procedure time.

## 2017-10-16 NOTE — CARE PLAN
Problem: Venous Thromboembolism (VTW)/Deep Vein Thrombosis (DVT) Prevention:  Goal: Patient will participate in Venous Thrombosis (VTE)/Deep Vein Thrombosis (DVT)Prevention Measures    Intervention: Ensure patient wears graduated elastic stockings (TARAH hose) and/or SCDs, if ordered, when in bed or chair (Remove at least once per shift for skin check)  SCD's in place.       Problem: Pain Management  Goal: Pain level will decrease to patient's comfort goal    Intervention: Follow pain managment plan developed in collaboration with patient and Interdisciplinary Team  Assessing patient's pain every two hours and PRN. Patient medicated with PRN, see MAR.

## 2017-10-16 NOTE — DISCHARGE PLANNING
Responded to STEMI.     Pt alert and talking in [employer]. Pt states he contacted his friends and does not need anyone called at this time. Pt stated friends Karishma and Tadeo are his main contacts; listed on pts facesheet.

## 2017-10-16 NOTE — CONSULTS
"Cardiology Consult Note:    Krupa Taylor  Date of Service:    10/16/2017   9:28 AM     Referring MD:  Dr. RODRIGUEZ/STEMI call/ Dr. Steve    Patient ID:   Name:             Linden Hathaway   YOB: 1955  Age:                 62 y.o.  male   MRN:               [id number]                                                             Chief Complaint:      CP, Anterior STEMI      History of Present Illness:    61 y/o male smoker, [facility] patient,  with CAD and 3 MI and 4 stents last at [place] c/o crushing CP started 8:30 AM; EKG showed anterior STEMI; Started ASA, UFH ; Cath lab notified for LHC/PCI      Review of Systems:      Constitutional: Denies fevers, Denies weight changes  Eyes: Denies changes in vision, no eye pain  Ears/Nose/Throat/Mouth: Denies nasal congestion or sore throat   Cardiovascular: + chest pain, + palpitations   Respiratory: + shortness of breath , Denies cough  Gastrointestinal/Hepatic: Denies abdominal pain, nausea, vomiting, diarrhea, constipation or GI bleeding   Genitourinary: Denies dysuria or frequency  Musculoskeletal/Rheum: Denies  joint pain and swelling   Skin: Denies rash  Neurological: Denies headache, confusion, memory loss or focal weakness/parasthesias  Psychiatric: denies mood disorder   Endocrine: Awa thyroid problems  Heme/Oncology/Lymph Nodes: Denies enlarged lymph nodes, denies brusing or known bleeding disorder  All other systems were reviewed and are negative (AMA/CMS criteria)                Past Medical History:   Past Medical History:   Diagnosis Date   • Asthma    • Bipolar disorder (CMS-HCC)     no meds   • Chronic back pain     6-7/10 when walking   • Cold     \"I had a cold 12/8/16 , felt really bad over the weekend, feeling better now\" denies sob, did have a productive cough   • Dental disorder     missing teeth, currently on antibx   • Diverticulitis    • Hemorrhagic disorder (CMS-HCC)     on Plavix   • High cholesterol    • History of back surgery  "   • Hypertension    • Infectious disease    • MI (myocardial infarction) (CMS-HCC) 2007,01/2015, 02/2015    w/ stents   • Pneumonia     2013   • Sleep apnea     uses CPAP   • Snoring      There are no active hospital problems to display for this patient.      Past Surgical History:  Past Surgical History:   Procedure Laterality Date   • EXTREME LATERAL INTERBODY FUSION  1/19/2016    Procedure: EXTREME LATERAL INTERBODY FUSION XLIF L1-2 (STAGE 1);  Surgeon: Ricky Jordan M.D.;  Location: SURGERY [facility];  Service:    • LUMBAR LAMINECTOMY DISKECTOMY  1/19/2016    Procedure: LUMBAR LAMINECTOMY DISKECTOMY FOR REDO LAMINOTOMIES L1-2 (STAGE 2);  Surgeon: Ricky Jordan M.D.;  Location: SURGERY [facility];  Service:    • OTHER CARDIAC SURGERY  02/2015    stent placement   • APPENDECTOMY     • OTHER ABDOMINAL SURGERY      colon resection    • OTHER NEUROLOGICAL SURG      x7 lumbar surgs       [facility] Medications:    Current Facility-Administered Medications:   •  NITROGLYCERIN 0.4 MG SL SUBL, , , ,   •  morphine (pf) 4 mg/ml injection, , , ,   •  ONDANSETRON HCL 4 MG/2ML INJ SOLN, , , ,   •  HEPARIN 1000 UNITS/ML OR USE ONLY, , , ,   •  LIDOCAINE HCL 2 % INJ SOLN, , , ,   •  HEPARIN (PORCINE) IN NACL 2-0.9 UNIT/ML-% INJ SOLN, , , ,   •  NITROGLYCERIN 2 MG IV SOLN, , , ,   •  MIDAZOLAM HCL 2 MG/2ML INJ SOLN, , , ,   •  FENTANYL CITRATE (PF) 0.05 MG/ML INJ SOLN, , , ,   •  VERAPAMIL HCL 2.5 MG/ML IV SOLN, , , ,     Current Outpatient Prescriptions:   •  atorvastatin (LIPITOR) 80 MG tablet, Take 80 mg by mouth every evening., Disp: , Rfl:   •  Omega 3 1000 MG Cap, Take 1,000 mg by mouth every day., Disp: , Rfl:   •  oxycodone, immediate release, (ROXICODONE) 5 MG TABS, Take 1 Tab by mouth every four hours as needed ((4-6)). (Patient taking differently: Take 5 mg by mouth every 8 hours as needed ((4-6)).), Disp: 16 Tab, Rfl: 0  •  metoprolol SR (TOPROL XL) 25 MG TB24, Take 2 Tabs by mouth every bedtime. (Patient  "taking differently: Take 50 mg by mouth 2 Times a Day.), Disp: 10 Tab, Rfl: 0  •  cholecalciferol (VITAMIN D3) 5000 UNIT CAPS, Take 10,000 Units by mouth every day., Disp: , Rfl:   •  Multiple Vitamins-Minerals (MULTIVITAMIN & MINERAL PO), Take 1 Tab by mouth every day., Disp: , Rfl:     Current Outpatient Medications:    (Not in a hospital admission)    Medication Allergy:  No Known Allergies    Family History:  No family history on file.    Social History:  Social History     Social History   • Marital status:      Spouse name: N/A   • Number of children: N/A   • Years of education: N/A     Occupational History   • Not on file.     Social History Main Topics   • Smoking status: Former Smoker     Packs/day: 1.00     Years: 30.00     Types: Cigarettes     Quit date: 4/1/2015   • Smokeless tobacco: Never Used   • Alcohol use No   • Drug use:       Comment: medical marijuana 2x week  -  last smoled 1/16/2016   • Sexual activity: Not on file     Other Topics Concern   • Not on file     Social History Narrative   • No narrative on file         Physical Exam:  Vitals  Weight/BMI: There is no height or weight on file to calculate BMI.  Blood pressure 129/90, pulse 93, temperature 36.6 °C (97.9 °F), resp. rate 20, height 1.803 m (5' 11\").  Vitals:    10/16/17 0906   BP: 129/90   Pulse: 93   Resp: 20   Temp: 36.6 °C (97.9 °F)   Height: 1.803 m (5' 11\")     Oxygen Therapy:     General Appearance:   Well developed, Well nourished, In acute distress, Non-toxic appearance.   HENT:  Normocephalic, Atraumatic, Oropharynx moist mucous membranes, Dentition: , Nose normal.    Eyes:  PERRLA, EOMI, Conjunctiva normal, No discharge.  Neck:  Normal range of motion, No cervical tenderness, Supple, No stridor, no JVD .  No thyromegaly.  No carotid bruit.  Cardiovascular:  Normal heart rate, Normal rhythm,  S1, S2, no S3,  S4; No gallops; No murmurs, No rubs, .   Extremitites with intact distal pulses, no cyanosis, clubbing or " edema.  No heaves, thrills, HJR;  Peripheral pulses: carotid 2+, brachial 2+, radial 2+, ulnar 2+, femoral 2+, popliteal 2+, PT 2+, DP 2+;  Lungs:  Respiratory effort is normal. Normal breath sounds, breath sounds clear to auscultation bilaterally,  no rales, no rhonchi, no wheezing.   Abdomen: Bowel sounds normal, Soft, No tenderness, No guarding, No rebound, No masses, No hepatosplenomegaly.  Skin: Warm, Dry, No erythema, No rash, no induration or crepitus.  Neurologic: Alert & oriented x 3, Normal motor function, Normal sensory function, No focal deficits noted, cranial nerves II through XII are normal,    Psychiatric: Affect normal, Judgment normal, Mood normal.      MDM (Data Review):     Records reviewed and summarized in current documentation    Lab Data Review:  No results found for this or any previous visit (from the past 24 hour(s)).    Imaging/Procedures Review:    Chest Xray:  Reviewed    EKG:   As in HPI. See above STEMI anterior leads    MDM (Assessment and Plan):     Anterior STEMI  CAD  Old MI's  Stents in coronary art  Smoker  Obesity    ASA, UFH;  LHC/PCI  Will follow  Thx

## 2017-10-16 NOTE — ASSESSMENT & PLAN NOTE
- With significant disease in the LAD, now status post stenting  - Continue medical management  - Additional recommendations per cardiology  - Patient is now pain-free

## 2017-10-16 NOTE — CATH LAB
Immediate Post-Operative Note      PreOp Diagnosis:   1. STEMI  2. CAD s/p prior PCI    PostOp Diagnosis:   1. LVEF33%  2. 99.9% culprit pLAD-D1 (Paz 1,1,1) stenosis, thrombus   3. Successful PCI LAD 3.5x18mm Xience Alpine ALLA, provisional D1, TIMI3 flow all vessels      Procedure(s) :  Coronary Angiography, Left Heart Catheterization, Left Ventriculography, PCI LAD ALLA    Surgeon(s):  Shaq Berrios M.D.    Type of Anesthesia: Moderate Sedation    Specimen: None    Complications: none        Shaq Berrios M.D.  10/16/2017 10:14 AM

## 2017-10-16 NOTE — PROGRESS NOTES
Received report from GOMEZ Hopkins. Assumed care of patient. Continuous infusions verified. Call light and personal belongings within reach.

## 2017-10-16 NOTE — ED NOTES
Pt bib remsa from home. Pt c/o off and on chest pain x 3 wks. This episode starting around 0800. Pt took home nitro 0.04 x 20 tabs with minimal relief. Pt also took ms contin and OxyContin and 325 mg asa x 3 pta. vss ekg done.

## 2017-10-16 NOTE — ED PROVIDER NOTES
ED Provider Note    Scribed for Lisandra Steve M.D. by Wm Brown. 10/16/2017  9:15 AM    Primary care provider: ANDREA Clark  Means of arrival: Ambulance  History obtained from: Patient  History limited by: None    CHIEF COMPLAINT  Chief Complaint   Patient presents with   • Chest Pain     HPI  Linden Hathaway is a 62 y.o. male who presents to the Emergency Department after being brought in by ambulance for worsened chest pains at 8:30AM this morning. The patient has past surgical history of cardiac surgery of 4 stents for history of MI and states he has been experiencing intermittent chest pains for the last 3 weeks. He woke up this morning at 8:30AM for worsened chest pains and medicated with 20 tablets of Nitro 0.04 and 3 tablets of Aspirin 325mg. He currently rates the pain 11/10 in severity. He has associated nausea and diaphoresis. The patient has no past history of diabetes. He is a daily smoker. [facility]     REVIEW OF SYSTEMS  CARDIAC: Chest pains.   GI: Nausea.   Endocrine: Diaphoresis    See history of present illness. All other systems are negative.  C.     PAST MEDICAL HISTORY   has a past medical history of Asthma; Bipolar disorder (CMS-HCC); Chronic back pain; Cold; Dental disorder; Diverticulitis; Hemorrhagic disorder (CMS-HCC); High cholesterol; History of back surgery; Hypertension; Infectious disease; MI (myocardial infarction) (CMS-HCC) (2007,01/2015, 02/2015); Pneumonia; Sleep apnea; and Snoring.    SURGICAL HISTORY   has a past surgical history that includes other cardiac surgery (02/2015); other neurological surg; appendectomy; other abdominal surgery; extreme lateral interbody fusion (1/19/2016); and lumbar laminectomy diskectomy (1/19/2016).    SOCIAL HISTORY  Social History   Substance Use Topics   • Smoking status: Former Smoker     Packs/day: 1.00     Years: 30.00     Types: Cigarettes     Quit date: 4/1/2015   • Smokeless tobacco: Never Used   • Alcohol use No       History   Drug Use     Comment: medical marijuana 2x week  -  last smoked 1/16/2016     FAMILY HISTORY  No family history noted     CURRENT MEDICATIONS    Current Facility-Administered Medications:   •  atorvastatin (LIPITOR) tablet 80 mg, 80 mg, Oral, Nightly, Nithin Duarte M.D.  •  senna-docusate (PERICOLACE or SENOKOT S) 8.6-50 MG per tablet 2 Tab, 2 Tab, Oral, BID **AND** polyethylene glycol/lytes (MIRALAX) PACKET 1 Packet, 1 Packet, Oral, QDAY PRN **AND** magnesium hydroxide (MILK OF MAGNESIA) suspension 30 mL, 30 mL, Oral, QDAY PRN **AND** bisacodyl (DULCOLAX) suppository 10 mg, 10 mg, Rectal, QDAY PRN, Nithin Duarte M.D.  •  NS infusion, , Intravenous, Continuous, Nithin Duarte M.D., Last Rate: 75 mL/hr at 10/16/17 1113  •  heparin injection 5,000 Units, 5,000 Units, Subcutaneous, Q8HRS, Nithin Duarte M.D., 5,000 Units at 10/16/17 1428  •  acetaminophen (TYLENOL) tablet 650 mg, 650 mg, Oral, Q6HRS PRN, Nithin Duarte M.D.  •  nitroglycerin (NITROSTAT) tablet 0.4 mg, 0.4 mg, Sublingual, Q5 MIN PRN, Nithin Duarte M.D.  •  morphine (pf) 4 mg/ml injection 2-4 mg, 2-4 mg, Intravenous, Q5 MIN PRN, Nithin Duarte M.D.  •  carvedilol (COREG) tablet 6.25 mg, 6.25 mg, Oral, BID WITH MEALS, Nithin Duarte M.D., Stopped at 10/16/17 1045  •  aspirin (ASA) tablet 325 mg, 325 mg, Oral, DAILY, Stopped at 10/16/17 1045 **OR** aspirin (ASA) chewable tab 324 mg, 324 mg, Oral, DAILY **OR** aspirin (ASA) suppository 300 mg, 300 mg, Rectal, DAILY, Nithin Duarte M.D.  •  ondansetron (ZOFRAN) syringe/vial injection 4 mg, 4 mg, Intravenous, Q4HRS PRN, Nithin Duarte M.D.  •  ondansetron (ZOFRAN ODT) dispertab 4 mg, 4 mg, Oral, Q4HRS PRN, Nithin Duarte M.D.  •  promethazine (PHENERGAN) tablet 12.5-25 mg, 12.5-25 mg, Oral, Q4HRS PRN, Nithin Duarte M.D.  •  promethazine (PHENERGAN) suppository 12.5-25 mg, 12.5-25 mg, Rectal, Q4HRS PRN, Nithin Duarte M.D.  •  prochlorperazine (COMPAZINE) injection 5-10 mg, 5-10 mg, Intravenous, Q4HRS PRN, Nithin Duarte M.D.  •   "[START ON 10/17/2017] aspirin EC (ECOTRIN) tablet 81 mg, 81 mg, Oral, DAILY, Shaq Berrios M.D.  •  [START ON 10/17/2017] prasugrel (EFFIENT) tablet 10 mg, 10 mg, Oral, DAILY, Shaq Berrios M.D.  •  spironolactone (ALDACTONE) tablet 12.5 mg, 12.5 mg, Oral, Q DAY, Shaq Berrios M.D., Stopped at 10/16/17 1030  •  nicotine (NICODERM) 21 MG/24HR 21 mg, 21 mg, Transdermal, Daily-0600, Nithin Duarte M.D., 21 mg at 10/16/17 1118  •  oxycodone immediate release (ROXICODONE) tablet 10 mg, 10 mg, Oral, Q4HRS PRN, Nithin Duarte M.D., 10 mg at 10/16/17 1202  •  morphine ER (MS CONTIN) tablet 15 mg, 15 mg, Oral, QHS, Nithin Duarte M.D.     ALLERGIES  No Known Allergies    PHYSICAL EXAM  VITAL SIGNS: /90   Pulse 93   Temp 36.6 °C (97.9 °F)   Resp 20   Ht 1.803 m (5' 11\")     Constitutional: Well developed, Well nourished, moderate distress complaining of chest pains.   HEENT: Normocephalic, Atraumatic,  external ears normal, pharynx pink,  Mucous  Membranes moist, No rhinorrhea or mucosal edema  Eyes: PERRL, EOMI, Conjunctiva normal, No discharge.   Neck: Normal range of motion, No tenderness, Supple, No stridor.   Lymphatic: No lymphadenopathy    Cardiovascular: Regular Rate and Rhythm, No murmurs,  rubs, or gallops.   Thorax & Lungs: Lungs clear to auscultation bilaterally, No respiratory distress, No wheezes, rhales or rhonchi, No chest wall tenderness.   Abdomen: Bowel sounds normal, Soft, non tender, non distended,  No pulsatile masses., no rebound guarding or peritoneal signs.   Skin: Warm, Dry, No erythema, No rash,   Back:  No CVA tenderness,  No spinal tenderness, bony crepitance, step offs, or instability.   Neurologic: Alert & oriented x 3, Normal motor function, Normal sensory function, No focal deficits noted. Normal reflexes. Normal Cranial Nerves.  Extremities: Equal, intact distal pulses, No cyanosis, clubbing or edema,  No tenderness.   Musculoskeletal: Good range of motion in all major joints. " No tenderness to palpation or major deformities noted.     DIAGNOSTIC STUDIES / PROCEDURES    EKG  12 lead EKG; Interpreted by emergency department physician    Rhythm: Normal Sinus Rhythm   Rate: 94  Axis: Normal  R Wave: Normal R wave progression  T waves: Normal  Q waves: None  Acute anterior ST elevation and inferior ST depression consistent with acute anterior MI.     Clinical Impression: Acute anterior MI.      COURSE & MEDICAL DECISION MAKING  Nursing notes, VS, PMSFHx reviewed in chart.    9:14 AM - I was acutely called to see and examine the patient at bedside. Patient will be treated with Nitroglycerin, Morphine, and Zofran. Ordered EKG to evaluate his symptoms. Discussed with the patient that he will be taken to Cath Lab immediately, which he understands and agrees with.    9:16 AM Paged Cardiology.     9:19 AM I discussed the patient's case and the above findings with Dr. Bellamy (Cardiology) who agreed to take the patient to the cath lab.      9:21 AM Patient was reevaluated at bedside with Dr. Taylor and discussed admission with the patient. He understands and agrees to plan of care.    9:25 AM Patient is now rating the pain 7/10 in severity after receiving medications.     9:29 AM Patient will be treated with more Morphine for increased pains.    9:30 AM  Patient was taken to Cath lab at this time.     9:32 AM I discussed the patient's case and the above findings with Dr. Duarte (Hospitalist) who agreed to admit the patient.     DISPOSITION:  Patient will be admitted to Dr. Duarte in critical condition. .     FINAL IMPRESSION  1. Anterior myocardial infarction (CMS-HCC)    2. ST elevation myocardial infarction involving left main coronary artery (CMS-HCC)      Wm CHAO), am scribing for, and in the presence of, Lisandra Steve M.D..    Electronically signed by: Wm Brown (Isadora), 10/16/2017    Lisandra CHAO M.D. personally performed the services described in this documentation, as  scribed by Wm Brown in my presence, and it is both accurate and complete.    The note accurately reflects work and decisions made by me.  Lisandra Steve  10/16/2017  4:29 PM

## 2017-10-16 NOTE — PROGRESS NOTES
3 mL removed from Hemoband every 15 minutes. Hemoband discontinued and dressing placed on right radial cath site. Site is clean, dry and intact.

## 2017-10-17 PROBLEM — I50.21 ACUTE SYSTOLIC HEART FAILURE (HCC): Status: ACTIVE | Noted: 2017-10-17

## 2017-10-17 LAB
ALBUMIN SERPL BCP-MCNC: 3.2 G/DL (ref 3.2–4.9)
ALBUMIN/GLOB SERPL: 1.3 G/DL
ALP SERPL-CCNC: 93 U/L (ref 30–99)
ALT SERPL-CCNC: 16 U/L (ref 2–50)
ANION GAP SERPL CALC-SCNC: 5 MMOL/L (ref 0–11.9)
AST SERPL-CCNC: 47 U/L (ref 12–45)
BILIRUB SERPL-MCNC: 0.4 MG/DL (ref 0.1–1.5)
BUN SERPL-MCNC: 12 MG/DL (ref 8–22)
CALCIUM SERPL-MCNC: 8.3 MG/DL (ref 8.5–10.5)
CHLORIDE SERPL-SCNC: 108 MMOL/L (ref 96–112)
CO2 SERPL-SCNC: 25 MMOL/L (ref 20–33)
CREAT SERPL-MCNC: 0.61 MG/DL (ref 0.5–1.4)
EKG IMPRESSION: NORMAL
ERYTHROCYTE [DISTWIDTH] IN BLOOD BY AUTOMATED COUNT: 47 FL (ref 35.9–50)
GFR SERPL CREATININE-BSD FRML MDRD: >60 ML/MIN/1.73 M 2
GLOBULIN SER CALC-MCNC: 2.4 G/DL (ref 1.9–3.5)
GLUCOSE SERPL-MCNC: 90 MG/DL (ref 65–99)
HCT VFR BLD AUTO: 38.9 % (ref 42–52)
HGB BLD-MCNC: 13.6 G/DL (ref 14–18)
LV EJECT FRACT MOD 2C 99903: 64.27
LV EJECT FRACT MOD 4C 99902: 56.54
LV EJECT FRACT MOD BP 99901: 61.43
MCH RBC QN AUTO: 32.9 PG (ref 27–33)
MCHC RBC AUTO-ENTMCNC: 35 G/DL (ref 33.7–35.3)
MCV RBC AUTO: 94 FL (ref 81.4–97.8)
PLATELET # BLD AUTO: 188 K/UL (ref 164–446)
PMV BLD AUTO: 10.7 FL (ref 9–12.9)
POTASSIUM SERPL-SCNC: 4.3 MMOL/L (ref 3.6–5.5)
PROT SERPL-MCNC: 5.6 G/DL (ref 6–8.2)
RBC # BLD AUTO: 4.14 M/UL (ref 4.7–6.1)
SODIUM SERPL-SCNC: 138 MMOL/L (ref 135–145)
WBC # BLD AUTO: 11.5 K/UL (ref 4.8–10.8)

## 2017-10-17 PROCEDURE — 97162 PT EVAL MOD COMPLEX 30 MIN: CPT

## 2017-10-17 PROCEDURE — G8980 MOBILITY D/C STATUS: HCPCS | Mod: CI

## 2017-10-17 PROCEDURE — A9270 NON-COVERED ITEM OR SERVICE: HCPCS | Performed by: INTERNAL MEDICINE

## 2017-10-17 PROCEDURE — 93010 ELECTROCARDIOGRAM REPORT: CPT | Performed by: INTERNAL MEDICINE

## 2017-10-17 PROCEDURE — G8978 MOBILITY CURRENT STATUS: HCPCS | Mod: CI

## 2017-10-17 PROCEDURE — G8979 MOBILITY GOAL STATUS: HCPCS | Mod: CI

## 2017-10-17 PROCEDURE — 700102 HCHG RX REV CODE 250 W/ 637 OVERRIDE(OP): Performed by: INTERNAL MEDICINE

## 2017-10-17 PROCEDURE — 80053 COMPREHEN METABOLIC PANEL: CPT

## 2017-10-17 PROCEDURE — 99233 SBSQ HOSP IP/OBS HIGH 50: CPT | Performed by: INTERNAL MEDICINE

## 2017-10-17 PROCEDURE — A9270 NON-COVERED ITEM OR SERVICE: HCPCS | Performed by: HOSPITALIST

## 2017-10-17 PROCEDURE — 93005 ELECTROCARDIOGRAM TRACING: CPT | Performed by: INTERNAL MEDICINE

## 2017-10-17 PROCEDURE — 700111 HCHG RX REV CODE 636 W/ 250 OVERRIDE (IP): Performed by: INTERNAL MEDICINE

## 2017-10-17 PROCEDURE — 97535 SELF CARE MNGMENT TRAINING: CPT

## 2017-10-17 PROCEDURE — 700102 HCHG RX REV CODE 250 W/ 637 OVERRIDE(OP): Performed by: HOSPITALIST

## 2017-10-17 PROCEDURE — 85027 COMPLETE CBC AUTOMATED: CPT

## 2017-10-17 PROCEDURE — 700105 HCHG RX REV CODE 258: Performed by: INTERNAL MEDICINE

## 2017-10-17 PROCEDURE — 770022 HCHG ROOM/CARE - ICU (200)

## 2017-10-17 RX ORDER — PRASUGREL 10 MG/1
10 TABLET, FILM COATED ORAL DAILY
Qty: 30 TAB | Refills: 3 | Status: SHIPPED | OUTPATIENT
Start: 2017-10-17 | End: 2023-07-03

## 2017-10-17 RX ADMIN — SODIUM CHLORIDE: 9 INJECTION, SOLUTION INTRAVENOUS at 14:16

## 2017-10-17 RX ADMIN — BUDESONIDE AND FORMOTEROL FUMARATE DIHYDRATE 2 PUFF: 160; 4.5 AEROSOL RESPIRATORY (INHALATION) at 08:36

## 2017-10-17 RX ADMIN — STANDARDIZED SENNA CONCENTRATE AND DOCUSATE SODIUM 2 TABLET: 8.6; 5 TABLET, FILM COATED ORAL at 21:10

## 2017-10-17 RX ADMIN — BUDESONIDE AND FORMOTEROL FUMARATE DIHYDRATE 2 PUFF: 160; 4.5 AEROSOL RESPIRATORY (INHALATION) at 23:18

## 2017-10-17 RX ADMIN — PRASUGREL HYDROCHLORIDE 10 MG: 10 TABLET, FILM COATED ORAL at 08:34

## 2017-10-17 RX ADMIN — HEPARIN SODIUM 5000 UNITS: 5000 INJECTION, SOLUTION INTRAVENOUS; SUBCUTANEOUS at 21:10

## 2017-10-17 RX ADMIN — ASPIRIN 81 MG: 81 TABLET, COATED ORAL at 08:34

## 2017-10-17 RX ADMIN — OXYCODONE HYDROCHLORIDE 10 MG: 10 TABLET ORAL at 18:01

## 2017-10-17 RX ADMIN — ATORVASTATIN CALCIUM 80 MG: 20 TABLET, FILM COATED ORAL at 21:10

## 2017-10-17 RX ADMIN — HEPARIN SODIUM 5000 UNITS: 5000 INJECTION, SOLUTION INTRAVENOUS; SUBCUTANEOUS at 13:51

## 2017-10-17 RX ADMIN — CARVEDILOL 6.25 MG: 6.25 TABLET, FILM COATED ORAL at 16:53

## 2017-10-17 RX ADMIN — HEPARIN SODIUM 5000 UNITS: 5000 INJECTION, SOLUTION INTRAVENOUS; SUBCUTANEOUS at 05:20

## 2017-10-17 RX ADMIN — OXYCODONE HYDROCHLORIDE 10 MG: 10 TABLET ORAL at 23:11

## 2017-10-17 RX ADMIN — SODIUM CHLORIDE: 9 INJECTION, SOLUTION INTRAVENOUS at 00:41

## 2017-10-17 RX ADMIN — OXYCODONE HYDROCHLORIDE 10 MG: 10 TABLET ORAL at 13:51

## 2017-10-17 RX ADMIN — NICOTINE 21 MG: 21 PATCH, EXTENDED RELEASE TRANSDERMAL at 06:00

## 2017-10-17 RX ADMIN — OXYCODONE HYDROCHLORIDE 10 MG: 10 TABLET ORAL at 08:35

## 2017-10-17 RX ADMIN — OXYCODONE HYDROCHLORIDE 10 MG: 10 TABLET ORAL at 02:49

## 2017-10-17 RX ADMIN — SPIRONOLACTONE 12.5 MG: 25 TABLET, FILM COATED ORAL at 08:35

## 2017-10-17 RX ADMIN — STANDARDIZED SENNA CONCENTRATE AND DOCUSATE SODIUM 2 TABLET: 8.6; 5 TABLET, FILM COATED ORAL at 08:35

## 2017-10-17 RX ADMIN — MORPHINE SULFATE 15 MG: 15 TABLET, FILM COATED, EXTENDED RELEASE ORAL at 21:10

## 2017-10-17 RX ADMIN — CARVEDILOL 6.25 MG: 6.25 TABLET, FILM COATED ORAL at 08:35

## 2017-10-17 ASSESSMENT — COGNITIVE AND FUNCTIONAL STATUS - GENERAL
MOBILITY SCORE: 22
SUGGESTED CMS G CODE MODIFIER MOBILITY: CJ
TURNING FROM BACK TO SIDE WHILE IN FLAT BAD: A LITTLE
MOVING TO AND FROM BED TO CHAIR: A LITTLE

## 2017-10-17 ASSESSMENT — PAIN SCALES - GENERAL
PAINLEVEL_OUTOF10: 3
PAINLEVEL_OUTOF10: 3
PAINLEVEL_OUTOF10: 5
PAINLEVEL_OUTOF10: 3
PAINLEVEL_OUTOF10: 0
PAINLEVEL_OUTOF10: 6
PAINLEVEL_OUTOF10: 0
PAINLEVEL_OUTOF10: 6
PAINLEVEL_OUTOF10: 4
PAINLEVEL_OUTOF10: 3
PAINLEVEL_OUTOF10: 5
PAINLEVEL_OUTOF10: 6
PAINLEVEL_OUTOF10: 6
PAINLEVEL_OUTOF10: 4
PAINLEVEL_OUTOF10: 3
PAINLEVEL_OUTOF10: 8
PAINLEVEL_OUTOF10: 3
PAINLEVEL_OUTOF10: 6

## 2017-10-17 ASSESSMENT — GAIT ASSESSMENTS
DEVIATION: OTHER (COMMENT)
DISTANCE (FEET): 250
ASSISTIVE DEVICE: OTHER (COMMENTS)
GAIT LEVEL OF ASSIST: STAND BY ASSIST

## 2017-10-17 ASSESSMENT — PATIENT HEALTH QUESTIONNAIRE - PHQ9
2. FEELING DOWN, DEPRESSED, IRRITABLE, OR HOPELESS: NOT AT ALL
SUM OF ALL RESPONSES TO PHQ9 QUESTIONS 1 AND 2: 0
1. LITTLE INTEREST OR PLEASURE IN DOING THINGS: NOT AT ALL
SUM OF ALL RESPONSES TO PHQ QUESTIONS 1-9: 0

## 2017-10-17 ASSESSMENT — COPD QUESTIONNAIRES
DURING THE PAST 4 WEEKS HOW MUCH DID YOU FEEL SHORT OF BREATH: SOME OF THE TIME
HAVE YOU SMOKED AT LEAST 100 CIGARETTES IN YOUR ENTIRE LIFE: YES
DO YOU EVER COUGH UP ANY MUCUS OR PHLEGM?: YES, A FEW DAYS A WEEK OR MONTH
COPD SCREENING SCORE: 7

## 2017-10-17 NOTE — DISCHARGE PLANNING
STEMI   Stent placed with LED.     A/O x4.  Lives alone. Able to care for self.   [facility].     61 y/o  male.  [facility] Resident.  [employer] Medicare Adv.     Likely home when cleared.

## 2017-10-17 NOTE — PROGRESS NOTES
Pt's close friend Karishma came to visit the patient. The patient request that his 2 home medication (inhalers) be given to Karishma to take home for safe keeping. Both inhalers given to Karishma to take out of the hospital.

## 2017-10-17 NOTE — PROGRESS NOTES
"Date of Service: 10/17/2017  Chief Complaint:  Chief Complaint   Patient presents with   • Chest Pain   61 y/o male smoker, [facility] patient,  with CAD and 3 MI and 4 stents last at [place] c/o crushing CP started 8:30 AM; EKG showed anterior STEMI  Interval History:  Anterior STEMI with pLAD stenting and reclosure d/t thrombosis; restudied and restented pLAD with good result; RCA 60% stenosis needs to be monitored and aggressively tx'd as outpatient.  Overnight, uneventful;     All recent medication, labs, imaging studies and procedures reviewed  LHC/PCI: 1. LVEF33%  2. 99.9% culprit pLAD-D1 (Paz 1,1,1) stenosis, thrombus   3. 60% focal distal RCA stenosis,  4. Successful PCI LAD 3.5x18mm Xience Alpine ALLA, provisional D1, TIMI3 flow all vessels    Recurrent CP and EKG changes: Re-studied:  POSTOPERATIVE DIAGNOSIS:  1. LVEF33%  2. 99.9% culprit pLAD-D1 (Paz 1,1,1) stenosis, thrombus   3. 60% focal distal RCA stenosis,   4. Successful PCI LAD 3.5x18mm Xience Alpine ALLA, provisional D1, TIMI3 flow all vessels  Physical Exam   Blood pressure 137/70, pulse 65, temperature 36.3 °C (97.4 °F), resp. rate (!) 25, height 1.803 m (5' 11\"), weight 90.5 kg (199 lb 8.3 oz), SpO2 97 %.    Constitutional:  He appears well-developed.   HENT: Normocephalic and atraumatic. No scleral icterus.   Neck: No JVD present.   Cardiovascular: Normal rate. RRR; Exam reveals no gallop and no friction rub. No murmur heard.   Pulmonary/Chest: CTAB coarse   Abdominal: S/NT/ND BS+   Musculoskeletal: He exhibits no edema. Pulses present.  Skin: Skin is warm and dry.       Intake/Output Summary (Last 24 hours) at 10/17/17 0708  Last data filed at 10/17/17 0400   Gross per 24 hour   Intake             1125 ml   Output             2700 ml   Net            -1575 ml       LABS:  Lab Results   Component Value Date/Time    CHOLSTRLTOT 223 (H) 01/21/2016 03:19 AM     (H) 01/21/2016 03:19 AM    HDL 38 (A) 01/21/2016 03:19 AM    TRIGLYCERIDE " 392 (H) 01/21/2016 03:19 AM       Lab Results   Component Value Date/Time    WBC 11.5 (H) 10/17/2017 04:20 AM    RBC 4.14 (L) 10/17/2017 04:20 AM    HEMOGLOBIN 13.6 (L) 10/17/2017 04:20 AM    HEMATOCRIT 38.9 (L) 10/17/2017 04:20 AM    MCV 94.0 10/17/2017 04:20 AM    NEUTSPOLYS 77.70 (H) 10/16/2017 09:21 AM    LYMPHOCYTES 13.30 (L) 10/16/2017 09:21 AM    MONOCYTES 6.10 10/16/2017 09:21 AM    EOSINOPHILS 1.80 10/16/2017 09:21 AM    BASOPHILS 0.50 10/16/2017 09:21 AM    HYPOCHROMIA 1+ 12/02/2013 08:45 AM    ANISOCYTOSIS 1+ 12/02/2013 08:45 AM     Lab Results   Component Value Date/Time    SODIUM 138 10/17/2017 04:20 AM    POTASSIUM 4.3 10/17/2017 04:20 AM    CHLORIDE 108 10/17/2017 04:20 AM    CO2 25 10/17/2017 04:20 AM    GLUCOSE 90 10/17/2017 04:20 AM    BUN 12 10/17/2017 04:20 AM    CREATININE 0.61 10/17/2017 04:20 AM         Lab Results   Component Value Date/Time    ALKPHOSPHAT 93 10/17/2017 04:20 AM    ASTSGOT 47 (H) 10/17/2017 04:20 AM    ALTSGPT 16 10/17/2017 04:20 AM    TBILIRUBIN 0.4 10/17/2017 04:20 AM      Lab Results   Component Value Date/Time    BNPBTYPENAT 16 10/16/2017 09:21 AM      No results found for: TSH  Lab Results   Component Value Date/Time    PROTHROMBTM 13.1 10/16/2017 09:21 AM    INR 0.96 10/16/2017 09:21 AM        Medications reviewed    Imaging reviewed    ECHO(10/16/2017):Left ventricular ejection fraction is visually estimated to be 55%.    Regional wall motion is difficult to assess.  Mild left ventricular septal hypertrophy.  No evidence of valvular abnormality based on Doppler evaluation    Impressions:  Anterior STEMI  Stenting pLAD  Stent thrombosis  Restenting pLAD    Recommendations:  Aggressive med tx and monitor RCA 60 % stenosis  ICR may be helpful  Case discussed with attending  Will follow as outpatient  Thx

## 2017-10-17 NOTE — PROGRESS NOTES
Family brought in patient's inhalers from home. This RN educated patient on that all home medications need to go to pharmacy or be taken back home by family member. Patient stated he would have family take medications back home. 2 inhalers placed in patient drawer for family to take home.

## 2017-10-17 NOTE — RESPIRATORY CARE
COPD EDUCATION by COPD CLINICAL EDUCATOR  10/17/2017 at 3:38 PM by Nicki Nelson     Patient reviewed by COPD education team. Patient does not qualify for COPD program.

## 2017-10-17 NOTE — ASSESSMENT & PLAN NOTE
- During cath ejection fraction was noted to be 33%, on 2-D echo was noted be 55%  - Medical management

## 2017-10-17 NOTE — CARE PLAN
Problem: Nutritional:  Goal: Patient to verbalize or demonstrate understanding of diet  Outcome: MET Date Met: 10/17/17  Provided Cardiac diet education and handouts. Patient was receptive and accepted all information.

## 2017-10-17 NOTE — THERAPY
"Physical Therapy Evaluation completed.   Bed Mobility:  Supine to Sit: Supervised  Transfers: Sit to Stand: Supervised  Gait: Level Of Assist: Stand by Assist with IV pole push; see below       Plan of Care: No further skilled acute PT needs  Discharge Recommendations: Equipment: No Equipment Needed. Outpatient cardiac rehab recs as appropriate; see below    Pt presents to PT after STEMI s/p stenting wtih EF of 35%. He was able to demonstrate hallway ambulation with IV pole push (normally uses cane) with SBA with no saeid LOB. He was given education re: phase I cardiac rehab, activity recs, self monitoring, general cardiovascular health education as related to function, initiation of walking program and outpatient cardiac rehab recs. Pt reports wanting to participate in outpatient cardiac rehab and has done so in the past. He has no further skilled acute PT needs, though should be mobilizing with staff while here as medically appropriate. He reports no concerns with functional ability to d/c to home enviornment once medically cleared.     See \"Rehab Therapy-Acute\" Patient Summary Report for complete documentation.     "

## 2017-10-17 NOTE — PROGRESS NOTES
[facility] Hospitalist Progress Note    Date of Service: 10/17/2017    Chief Complaint  62 y.o. male admitted 10/16/2017 with chest pain.    Interval Problem Update  Upon arrival noted STEMI.  Patient was emergently taken to cath lab, noted nearly 100% lesion in the LAD-D1.  Stents was successfully placed.  Patient seen and examined in the ICU, ICU care given.  Discussed patient condition and plan with RN, RT and charge nurse / hot rounds.    Consultants/Specialty  Cardiology-Dr. Taylor    Disposition  Patient requires additional treatment in the hospital, should go home upon discharge        ROS   Physical Exam  Laboratory/Imaging   Hemodynamics  Temp (24hrs), Av.3 °C (97.3 °F), Min:36.2 °C (97.2 °F), Max:36.3 °C (97.4 °F)   Temperature: 36.3 °C (97.4 °F)  Pulse  Av.1  Min: 57  Max: 93 Heart Rate (Monitored): 67  NIBP: 136/68      Respiratory      Respiration: (!) 11, Pulse Oximetry: 95 %, O2 Daily Delivery Respiratory : Silicone Nasal Cannula     Given By:: Mouthpiece  RUL Breath Sounds: Clear, RML Breath Sounds: Diminished, RLL Breath Sounds: Clear, KATARZYAN Breath Sounds: Diminished, LLL Breath Sounds: Diminished    Fluids    Intake/Output Summary (Last 24 hours) at 10/17/17 1034  Last data filed at 10/17/17 0800   Gross per 24 hour   Intake             1375 ml   Output             3350 ml   Net            -1975 ml       Nutrition  Orders Placed This Encounter   Procedures   • Diet Order     Standing Status:   Standing     Number of Occurrences:   1     Order Specific Question:   Diet:     Answer:   Cardiac [6]     Physical Exam    Recent Labs      10/16/17   0921  10/17/17   0420   WBC  19.6*  11.5*   RBC  5.01  4.14*   HEMOGLOBIN  16.5  13.6*   HEMATOCRIT  46.5  38.9*   MCV  92.8  94.0   MCH  32.9  32.9   MCHC  35.5*  35.0   RDW  46.5  47.0   PLATELETCT  288  188   MPV  10.0  10.7     Recent Labs      10/16/17   0921  10/17/17   0420   SODIUM  136  138   POTASSIUM  3.9  4.3   CHLORIDE  101  108   CO2  25  25    GLUCOSE  135*  90   BUN  12  12   CREATININE  0.79  0.61   CALCIUM  9.0  8.3*     Recent Labs      10/16/17   0921   APTT  24.6*   INR  0.96     Recent Labs      10/16/17   0921   BNPBTYPENAT  16              Assessment/Plan     STEMI (ST elevation myocardial infarction) (CMS-HCC)- (present on admission)   Assessment & Plan    - With significant disease in the LAD, now status post stenting  - Continue medical management  - Additional recommendations per cardiology  - Patient is now pain-free        Acute systolic heart failure (CMS-HCC)   Assessment & Plan    - During cath ejection fraction was noted to be 33%, on 2-D echo was noted be 55%  - Medical management        Chronic pain syndrome- (present on admission)   Assessment & Plan    - Symptomatic care        Leucocytosis- (present on admission)   Assessment & Plan    - Reactive, no additional sinus infection        Nicotine abuse- (present on admission)   Assessment & Plan    - Nicotine patch in place          Quality-Core Measures

## 2017-10-17 NOTE — PROGRESS NOTES
Cardiovascular Nurse Navigator ([id number]) Note:    Reviewed ACS medications:  DAPT: aspirin + prasugrel  Beta-Blocker:  carvedilol  Statin:  atorvastatin    Consider for aldosterone blockade?   EF 55% by echo, patient is on spironolactone  Consider for ACE-I? No -- EF 55%    Intensive Cardiac Rehab (ICR) Referral:  Referred on 10/16; has current inpatient orders for nutrition consult & PT for Phase I ICR    Inpatient & Discharge Patient Education:  Bedside nursing to continually provide patient education on ACS meds, signs and symptoms to monitor for, and risk factor modification.     Also at discharge please complete the “ACS” special instructions on the AVS.      Orders Placed:    Social Work     Ensure [facility] coverage [facility]ugrel. Texted Dr. Taylor requesting that he provide script to  to facilitate.      Thank you and please call with questions.

## 2017-10-18 VITALS
HEART RATE: 63 BPM | TEMPERATURE: 98.6 F | SYSTOLIC BLOOD PRESSURE: 143 MMHG | OXYGEN SATURATION: 95 % | WEIGHT: 200 LBS | RESPIRATION RATE: 15 BRPM | HEIGHT: 71 IN | BODY MASS INDEX: 28 KG/M2 | DIASTOLIC BLOOD PRESSURE: 59 MMHG

## 2017-10-18 PROBLEM — I21.3 STEMI (ST ELEVATION MYOCARDIAL INFARCTION) (HCC): Status: RESOLVED | Noted: 2017-10-16 | Resolved: 2017-10-18

## 2017-10-18 PROBLEM — D72.829 LEUCOCYTOSIS: Status: RESOLVED | Noted: 2017-10-16 | Resolved: 2017-10-18

## 2017-10-18 PROCEDURE — 700102 HCHG RX REV CODE 250 W/ 637 OVERRIDE(OP): Performed by: INTERNAL MEDICINE

## 2017-10-18 PROCEDURE — 99239 HOSP IP/OBS DSCHRG MGMT >30: CPT | Performed by: INTERNAL MEDICINE

## 2017-10-18 PROCEDURE — A9270 NON-COVERED ITEM OR SERVICE: HCPCS | Performed by: INTERNAL MEDICINE

## 2017-10-18 PROCEDURE — 700105 HCHG RX REV CODE 258: Performed by: INTERNAL MEDICINE

## 2017-10-18 PROCEDURE — 700111 HCHG RX REV CODE 636 W/ 250 OVERRIDE (IP): Performed by: INTERNAL MEDICINE

## 2017-10-18 RX ORDER — ASPIRIN 81 MG/1
81 TABLET ORAL EVERY MORNING
Qty: 30 TAB | COMMUNITY
Start: 2017-10-18 | End: 2023-07-03

## 2017-10-18 RX ORDER — ATORVASTATIN CALCIUM 80 MG/1
80 TABLET, FILM COATED ORAL EVERY EVENING
Qty: 30 TAB | Refills: 0 | Status: SHIPPED | OUTPATIENT
Start: 2017-10-18 | End: 2017-10-21

## 2017-10-18 RX ORDER — SPIRONOLACTONE 25 MG/1
12.5 TABLET ORAL DAILY
Qty: 30 TAB | Refills: 3 | Status: SHIPPED | OUTPATIENT
Start: 2017-10-18 | End: 2023-07-03

## 2017-10-18 RX ORDER — CARVEDILOL 6.25 MG/1
6.25 TABLET ORAL 2 TIMES DAILY WITH MEALS
Qty: 60 TAB | Refills: 0 | Status: SHIPPED | OUTPATIENT
Start: 2017-10-18 | End: 2023-07-03

## 2017-10-18 RX ADMIN — HEPARIN SODIUM 5000 UNITS: 5000 INJECTION, SOLUTION INTRAVENOUS; SUBCUTANEOUS at 05:36

## 2017-10-18 RX ADMIN — SPIRONOLACTONE 12.5 MG: 25 TABLET, FILM COATED ORAL at 08:10

## 2017-10-18 RX ADMIN — ASPIRIN 81 MG: 81 TABLET, COATED ORAL at 08:10

## 2017-10-18 RX ADMIN — PRASUGREL HYDROCHLORIDE 10 MG: 10 TABLET, FILM COATED ORAL at 08:11

## 2017-10-18 RX ADMIN — OXYCODONE HYDROCHLORIDE 10 MG: 10 TABLET ORAL at 08:23

## 2017-10-18 RX ADMIN — NICOTINE 21 MG: 21 PATCH, EXTENDED RELEASE TRANSDERMAL at 05:36

## 2017-10-18 RX ADMIN — OXYCODONE HYDROCHLORIDE 10 MG: 10 TABLET ORAL at 04:43

## 2017-10-18 RX ADMIN — SODIUM CHLORIDE: 9 INJECTION, SOLUTION INTRAVENOUS at 05:00

## 2017-10-18 ASSESSMENT — PAIN SCALES - GENERAL
PAINLEVEL_OUTOF10: 4
PAINLEVEL_OUTOF10: 3
PAINLEVEL_OUTOF10: 4
PAINLEVEL_OUTOF10: 5
PAINLEVEL_OUTOF10: 4
PAINLEVEL_OUTOF10: 8
PAINLEVEL_OUTOF10: 7
PAINLEVEL_OUTOF10: 5

## 2017-10-18 ASSESSMENT — LIFESTYLE VARIABLES: EVER_SMOKED: YES

## 2017-10-18 NOTE — CARE PLAN
Problem: Safety  Goal: Will remain free from injury    Intervention: Provide assistance with mobility  PT and RN have both walked patient today, pt tolerated IV pole push with PT and single point cane use with RN.

## 2017-10-18 NOTE — CARE PLAN
Problem: Pain Management  Goal: Pain level will decrease to patient's comfort goal    Intervention: Educate and implement non-pharmacologic comfort measures. Examples: relaxation, distration, play therapy, activity therapy, massage, etc.  Pt encouraged to call for assistance to help with repositioning or ambulating to help relieve chronic back pain

## 2017-10-18 NOTE — DISCHARGE SUMMARY
CHIEF COMPLAINT ON ADMISSION  Chief Complaint   Patient presents with   • Chest Pain       CODE STATUS  Full Code    HPI & HOSPITAL COURSE  This is a 62 y.o. male here withChest pain. Patient has a known history of coronary artery disease, status post 4 stents. Patient states he had been having intermittent chest pain over the last 3 weeks, the morning of admission and was very severe pressure-like sensation with radiation to his left arm and neck. Upon arrival patient was noted to have a STEMI, was emergently taken to Cath Lab. Patient was noted to have nearly 100% lesion in the LAD-D1. Stent successfully placed. Afterward patient had resolution of his chest pain. Patient does state that he had been taking upwards of 40 nitro tabs, instructed that if he has to take more than a couple he needs to present to the emergency department. Patient understood this and agreed. At this point in time patient is markedly improved, is ambulating the hallway without issue, chest pain has resolved.    Therefore, he is discharged in good and stable condition with close outpatient follow-up.    SPECIFIC OUTPATIENT FOLLOW-UP  Follow-up with primary care provider within one week  Follow-up with cardiology as scheduled  Emergency department or 911 in case of emergency    DISCHARGE PROBLEM LIST  Active Problems:    Nicotine abuse POA: Yes    Chronic pain syndrome POA: Yes    Acute systolic heart failure (CMS-HCC) POA: Unknown  Resolved Problems:    STEMI (ST elevation myocardial infarction) (CMS-[facility]) POA: Yes    Leucocytosis POA: Yes      FOLLOW UP  No future appointments.  No follow-up provider specified.    MEDICATIONS ON DISCHARGE   Linden Hathaway   Home Medication Instructions AVERY:[id number]    Printed on:10/18/17 1231   Medication Information                      aspirin EC 81 MG EC tablet  Take 1 Tab by mouth every day.             atorvastatin (LIPITOR) 80 MG tablet  Take 1 Tab by mouth every evening.             carvedilol  (COREG) 6.25 MG Tab  Take 1 Tab by mouth 2 times a day, with meals.             cholecalciferol (VITAMIN D3) 5000 UNIT CAPS  Take 10,000 Units by mouth every day.             Multiple Vitamins-Minerals (MULTIVITAMIN & MINERAL PO)  Take 1 Tab by mouth every day.             Omega 3 1000 MG Cap  Take 1,000 mg by mouth every day.             oxycodone, immediate release, (ROXICODONE) 5 MG TABS  Take 1 Tab by mouth every four hours as needed ((4-6)).             prasugrel (EFFIENT) 10 MG Tab  Take 1 Tab by mouth every day.             spironolactone (ALDACTONE) 25 MG Tab  Take 0.5 Tabs by mouth every day.                 DIET  Orders Placed This Encounter   Procedures   • Diet Order     Standing Status:   Standing     Number of Occurrences:   1     Order Specific Question:   Diet:     Answer:   Cardiac [6]       ACTIVITY  As tolerated.  Weight bearing as tolerated      CONSULTATIONS  Cardiology-Dr. Taylor    PROCEDURES  Cardiac catheterization performed by Dr. Berrios on 10/16 which showed a 99.9% stenosis of the LAD-D1, stenting provided successfully    LABORATORY  Lab Results   Component Value Date/Time    SODIUM 138 10/17/2017 04:20 AM    POTASSIUM 4.3 10/17/2017 04:20 AM    CHLORIDE 108 10/17/2017 04:20 AM    CO2 25 10/17/2017 04:20 AM    GLUCOSE 90 10/17/2017 04:20 AM    BUN 12 10/17/2017 04:20 AM    CREATININE 0.61 10/17/2017 04:20 AM        Lab Results   Component Value Date/Time    WBC 11.5 (H) 10/17/2017 04:20 AM    HEMOGLOBIN 13.6 (L) 10/17/2017 04:20 AM    HEMATOCRIT 38.9 (L) 10/17/2017 04:20 AM    PLATELETCT 188 10/17/2017 04:20 AM        Total time of the discharge process exceeds 36 minutes

## 2017-10-18 NOTE — DISCHARGE PLANNING
No over night events.  Up with SBA.  Room Air.      Home when cleared.  CARDS signed off.  Discussed with patient and [facility]  Discussed Effient is given at [facility].  [facility].

## 2017-10-18 NOTE — PROGRESS NOTES
"Date of Service: 10/18/2017  Chief Complaint:  Chief Complaint   Patient presents with   • Chest Pain   63 y/o male smoker, [facility] patient,  with CAD and 3 MI and 4 stents last at [place] c/o crushing CP started 8:30 AM; EKG showed anterior STEMI  Interval History:  Ambulate well and no cardiac sx's; ready to be d/c'd  All recent medication, labs, imaging studies and procedures reviewed  Anterior STEMI with pLAD stenting and reclosure d/t thrombosis; restudied and restented pLAD with good result; RCA 60% stenosis needs to be monitored and aggressively tx'd as outpatient.  Overnight, uneventful;      All recent medication, labs, imaging studies and procedures reviewed  LHC/PCI: 1. LVEF33%  2. 99.9% culprit pLAD-D1 (Paz 1,1,1) stenosis, thrombus   3. 60% focal distal RCA stenosis,  4. Successful PCI LAD 3.5x18mm Xience Alpine ALLA, provisional D1, TIMI3 flow all vessels     Recurrent CP and EKG changes: Re-studied:  POSTOPERATIVE DIAGNOSIS:  1. LVEF33%  2. 99.9% culprit pLAD-D1 (Paz 1,1,1) stenosis, thrombus   3. 60% focal distal RCA stenosis,   4. Successful PCI LAD 3.5x18mm Xience Alpine ALLA, provisional D1, TIMI3 flow all vessels  Physical Exam   Blood pressure 143/59, pulse 66, temperature 36.6 °C (97.9 °F), resp. rate 15, height 1.803 m (5' 11\"), weight 90.5 kg (199 lb 8.3 oz), SpO2 92 %.    Constitutional:  He appears well-developed.   HENT: Normocephalic and atraumatic. No scleral icterus.   Neck: No JVD present.   Cardiovascular: Normal rate. RRR; Exam reveals no gallop and no friction rub. No murmur heard.   Pulmonary/Chest: CTAB coarse   Abdominal: S/NT/ND BS+   Musculoskeletal: He exhibits no edema. Pulses present.  Skin: Skin is warm and dry.     Intake/Output Summary (Last 24 hours) at 10/18/17 0745  Last data filed at 10/18/17 0200   Gross per 24 hour   Intake             1400 ml   Output             1650 ml   Net             -250 ml       LABS:  Lab Results   Component Value Date/Time    " CHOLSTRLTOT 223 (H) 01/21/2016 03:19 AM     (H) 01/21/2016 03:19 AM    HDL 38 (A) 01/21/2016 03:19 AM    TRIGLYCERIDE 392 (H) 01/21/2016 03:19 AM       Lab Results   Component Value Date/Time    WBC 11.5 (H) 10/17/2017 04:20 AM    RBC 4.14 (L) 10/17/2017 04:20 AM    HEMOGLOBIN 13.6 (L) 10/17/2017 04:20 AM    HEMATOCRIT 38.9 (L) 10/17/2017 04:20 AM    MCV 94.0 10/17/2017 04:20 AM    NEUTSPOLYS 77.70 (H) 10/16/2017 09:21 AM    LYMPHOCYTES 13.30 (L) 10/16/2017 09:21 AM    MONOCYTES 6.10 10/16/2017 09:21 AM    EOSINOPHILS 1.80 10/16/2017 09:21 AM    BASOPHILS 0.50 10/16/2017 09:21 AM    HYPOCHROMIA 1+ 12/02/2013 08:45 AM    ANISOCYTOSIS 1+ 12/02/2013 08:45 AM     Lab Results   Component Value Date/Time    SODIUM 138 10/17/2017 04:20 AM    POTASSIUM 4.3 10/17/2017 04:20 AM    CHLORIDE 108 10/17/2017 04:20 AM    CO2 25 10/17/2017 04:20 AM    GLUCOSE 90 10/17/2017 04:20 AM    BUN 12 10/17/2017 04:20 AM    CREATININE 0.61 10/17/2017 04:20 AM         Lab Results   Component Value Date/Time    ALKPHOSPHAT 93 10/17/2017 04:20 AM    ASTSGOT 47 (H) 10/17/2017 04:20 AM    ALTSGPT 16 10/17/2017 04:20 AM    TBILIRUBIN 0.4 10/17/2017 04:20 AM      Lab Results   Component Value Date/Time    BNPBTYPENAT 16 10/16/2017 09:21 AM      No results found for: TSH  Lab Results   Component Value Date/Time    PROTHROMBTM 13.1 10/16/2017 09:21 AM    INR 0.96 10/16/2017 09:21 AM        Medications reviewed    Imaging reviewed    ECHO(10/16/2017):Left ventricular ejection fraction is visually estimated to be 55%.    Regional wall motion is difficult to assess.  Mild left ventricular septal hypertrophy.  No evidence of valvular abnormality based on Doppler evaluation     Impressions:  Anterior STEMI  Stenting pLAD  Stent thrombosis  Restenting pLAD     Recommendations:  Aggressive med tx and monitor RCA 60 % stenosis  ICR may be helpful  Case discussed with attending  Will follow as outpatient  Thx

## 2017-10-18 NOTE — DISCHARGE INSTRUCTIONS
Discharge Instructions    Discharged to home by car with relative. Discharged via wheelchair, hospital escort: Yes.  Special equipment needed: [place]    Be sure to schedule a follow-up appointment with your primary care doctor or any specialists as instructed.     Discharge Plan:   Diet Plan: Discussed  Activity Level: Discussed  Smoking Cessation Offered: Patient Refused  Confirmed Follow up Appointment: Patient to Call and Schedule Appointment  Confirmed Symptoms Management: Discussed  Medication Reconciliation Updated: Yes  Influenza Vaccine Indication: Patient Refuses    I understand that a diet low in cholesterol, fat, and sodium is recommended for good health. Unless I have been given specific instructions below for another diet, I accept this instruction as my diet prescription.   Other diet: Cardiac    Special Instructions: Diagnosis:  Acute Coronary Syndrome (ACS) is a diagnosis that encompasses cardiac-related chest pain and heart attack. ACS occurs when the blood flow to the heart muscle is severely reduced or cut off completely due to a slow process called atherosclerosis.  Atherosclerosis is a disease in which the coronary arteries become narrow from a buildup of fat, cholesterol, and other substances that combine to form plaque. If the plaque breaks, a blood clot will form and block the blood flow to the heart muscle. This lack of blood flow can cause damage or death to the heart muscle which is called a heart attack or Myocardial Infarction (MI). There are two different types of MIs:  ST Elevation Myocardial Infarction or STEMI (the most severe type of heart attack) and Non-ST Elevation Myocardial Infarction or NSTEMI.    Treatment Plan:  · Cardiac Diet  - Low fat, low salt, low cholesterol   · Cardiac Rehab  - Your doctor has ordered you a referral to [facility].  Call [phone] to schedule an appointment.  · Attend my follow-up appointment with my Cardiologist.  · Take my medications as prescribed by  my doctor  · Exercise daily  · Quit Smoking, Lower my bad cholesterol and raise my good cholesterol, lower my blood pressure and Reduce stress    Medications:  Certain medications are used to treat ACS.  Remember to always take medications as prescribed and never stop talking medications unless told by your doctor.    You have been prescribed the following medicatons:    Aspirin - Aspirin is used as a blood thinning medication and you will require this medication indefinitely.  Anti-platelet/blood thinner - Your Anti-platelet/Blood thinning medication is called Effient, and is used in combination with aspirin to prevent clots from forming in your heart and/or around your stent.  Your doctor will determine how long you need to be on this medicine.    · Is patient discharged on Warfarin / Coumadin?   No     · Is patient Post Blood Transfusion?  No      Heart Attack  A heart attack (myocardial infarction, MI) causes damage to your heart that cannot be fixed. A heart attack can happen when a heart (coronary) artery becomes blocked or narrowed. This cuts off the blood supply and oxygen to your heart.  When one or more of your coronary arteries becomes blocked, that area of your heart begins to die. This causes the pain you feel during a heart attack. Heart attack pain can also occur in one part of the body but be felt in another part of the body (referred pain). You may feel referred heart attack pain in your left arm, neck, or jaw. Pain may even be felt in the right arm.  CAUSES   Many conditions can cause a heart attack. These include:   · Atherosclerosis. This is when a fatty substance (plaque) gradually builds up in the arteries. This buildup can block or reduce the blood supply to one or more of the heart arteries.  · A blood clot. A blood clot can develop suddenly when plaque breaks up (ruptures) within a heart artery. A blood clot can block the heart artery, which prevents blood flow to the heart.    · Severe  tightening (spasm) of the heart artery. This cuts off blood flow through the artery.    RISK FACTORS  People at risk for heart attack usually have one or more of the following risk factors:   · High blood pressure (hypertension).  · High cholesterol.  · Smoking.  · Being male.  · Being overweight or obese.  · Older aged.     · A family history of heart disease.  · Lack of exercise.  · Diabetes.  · Stress.  · Drinking too much alcohol.  · Using illegal street drugs, such as cocaine and methamphetamines.  SYMPTOMS   Heart attack symptoms can vary from person to person. Symptoms depend on factors like gender and age.   · In both men and women, heart attack symptoms can include the following:    ¨ Chest pain. This may feel like crushing, squeezing, or a feeling of pressure.  ¨ Shortness of breath.  ¨ Heartburn or indigestion with or without vomiting, shortness of breath, or sweating.  ¨ Sudden cold sweats.  ¨ Sudden light-headedness.  ¨ Upper back pain.    · Women can have unique heart attack symptoms, such as:    ¨ Unexplained feelings of nervousness or anxiety.  ¨ Discomfort between the shoulder blades or upper back.  ¨ Tingling in the hands and arms.    · Older people (of both genders) can have subtle heart attack symptoms, such as:    ¨ Sweating.  ¨ Shortness of breath.  ¨ General tiredness or not feeling well.    DIAGNOSIS   Diagnosing a heart attack involves several tests. They include:   · An assessment of your vital signs. This includes checking your:  ¨ Heart rhythm.  ¨ Blood pressure.  ¨ Breathing rate.  ¨ Oxygen level.    · An ECG (electrocardiogram) to measure the electrical activity of your heart.  · Blood tests called cardiac markers. In these tests, blood is drawn at scheduled times to check for the specific proteins or enzymes released by damaged heart muscle.  · A chest X-ray.  · An echocardiogram to evaluate heart motion and blood flow.  · Coronary angiography to look at the heart arteries.     TREATMENT   Treatment for a heart attack may include:   · Medicine that breaks up or dissolves blood clots in the heart artery.  · Angioplasty.  · Cardiac stent placement.  · Intra-aortic balloon pump therapy (IABP).  · Open heart surgery (coronary artery bypass graft, CABG).  HOME CARE INSTRUCTIONS  · Take medicines only as directed by your health care provider. You may need to take medicine after a heart attack to:    ¨ Keep your blood from clotting too easily.  ¨ Control your blood pressure.  ¨ Lower your cholesterol.  ¨ Control abnormal heart rhythms.    · Do not take the following medicines unless your health care provider approves:  ¨ Nonsteroidal anti-inflammatory drugs (NSAIDs), such as ibuprofen, naproxen, or celecoxib.  ¨ Vitamin supplements that contain vitamin A, vitamin E, or both.  ¨ Hormone replacement therapy that contains estrogen with or without progestin.  · Make lifestyle changes as directed by your health care provider. These may include:    ¨ Quitting smoking, if you smoke.  ¨ Getting regular exercise. Ask your health care provider to suggest some activities that are safe for you.  ¨ Eating a heart-healthy diet. A registered dietitian can help you learn healthy eating options.  ¨ Maintaining a healthy weight.    ¨ Managing diabetes, if necessary.  ¨ Reducing stress.  ¨ Limiting how much alcohol you drink.  SEEK IMMEDIATE MEDICAL CARE IF:   · You have sudden, unexplained chest discomfort.  · You have sudden, unexplained discomfort in your arms, back, neck, or jaw.  · You have shortness of breath at any time.  · You suddenly start to sweat or your skin gets clammy.  · You feel nauseous or vomit.  · You suddenly feel light-headed or dizzy.  · Your heart begins to beat fast or feels like it is skipping beats.  These symptoms may represent a serious problem that is an emergency. Do not wait to see if the symptoms will go away. Get medical help right away. Call your local emergency services (022  in the [place] Do not drive yourself to the hospital.     This information is not intended to replace advice given to you by your health care provider. Make sure you discuss any questions you have with your health care provider.     Document Released: 12/18/2006 Document Revised: 01/08/2016 Document Reviewed: 02/20/2015  [employer] Interactive Patient Education ©2016 [employer]    Depression / Suicide Risk    As you are discharged from this [facility] facility, it is important to learn how to keep safe from harming yourself.    Recognize the warning signs:  · Abrupt changes in personality, positive or negative- including increase in energy   · Giving away possessions  · Change in eating patterns- significant weight changes-  positive or negative  · Change in sleeping patterns- unable to sleep or sleeping all the time   · Unwillingness or inability to communicate  · Depression  · Unusual sadness, discouragement and loneliness  · Talk of wanting to die  · Neglect of personal appearance   · Rebelliousness- reckless behavior  · Withdrawal from people/activities they love  · Confusion- inability to concentrate     If you or a loved one observes any of these behaviors or has concerns about self-harm, here's what you can do:  · Talk about it- your feelings and reasons for harming yourself  · Remove any means that you might use to hurt yourself (examples: pills, rope, extension cords, firearm)  · Get professional help from the community (Mental Health, Substance Abuse, psychological counseling)  · Do not be alone:Call your Safe Contact- someone whom you trust who will be there for you.  · Call your local CRISIS HOTLINE 368-3862 or 878-002-8111  · Call your local Children's Mobile Crisis Response Team [place] (584) 628-3257 or www.[employer]  · Call the toll free National Suicide Prevention Hotlines   · National Suicide Prevention Lifeline 443-184-PJEI (2768)  · National Hope Line Network 800-SUICIDE  (227-4285)

## 2017-10-21 ENCOUNTER — RESOLUTE PROFESSIONAL BILLING HOSPITAL PROF FEE (OUTPATIENT)
Dept: HOSPITALIST | Facility: MEDICAL CENTER | Age: 62
End: 2017-10-21
Payer: MEDICARE

## 2017-10-21 ENCOUNTER — HOSPITAL ENCOUNTER (INPATIENT)
Facility: MEDICAL CENTER | Age: 62
LOS: 3 days | DRG: 315 | End: 2017-10-24
Attending: EMERGENCY MEDICINE | Admitting: HOSPITALIST
Payer: MEDICARE

## 2017-10-21 DIAGNOSIS — I74.2 RADIAL ARTERY THROMBOSIS (HCC): ICD-10-CM

## 2017-10-21 DIAGNOSIS — T81.40XA POSTOPERATIVE INFECTION, INITIAL ENCOUNTER: ICD-10-CM

## 2017-10-21 PROBLEM — I74.9 ARTERIAL THROMBOSIS (HCC): Status: ACTIVE | Noted: 2017-10-21

## 2017-10-21 PROBLEM — L03.90 CELLULITIS: Status: ACTIVE | Noted: 2017-10-21

## 2017-10-21 LAB
ALBUMIN SERPL BCP-MCNC: 3.7 G/DL (ref 3.2–4.9)
ALBUMIN/GLOB SERPL: 1.3 G/DL
ALP SERPL-CCNC: 108 U/L (ref 30–99)
ALT SERPL-CCNC: 24 U/L (ref 2–50)
ANION GAP SERPL CALC-SCNC: 9 MMOL/L (ref 0–11.9)
AST SERPL-CCNC: 24 U/L (ref 12–45)
BASOPHILS # BLD AUTO: 0.5 % (ref 0–1.8)
BASOPHILS # BLD: 0.07 K/UL (ref 0–0.12)
BILIRUB SERPL-MCNC: 0.3 MG/DL (ref 0.1–1.5)
BUN SERPL-MCNC: 18 MG/DL (ref 8–22)
CALCIUM SERPL-MCNC: 9 MG/DL (ref 8.5–10.5)
CHLORIDE SERPL-SCNC: 106 MMOL/L (ref 96–112)
CO2 SERPL-SCNC: 24 MMOL/L (ref 20–33)
CREAT SERPL-MCNC: 0.89 MG/DL (ref 0.5–1.4)
EOSINOPHIL # BLD AUTO: 0.52 K/UL (ref 0–0.51)
EOSINOPHIL NFR BLD: 3.9 % (ref 0–6.9)
ERYTHROCYTE [DISTWIDTH] IN BLOOD BY AUTOMATED COUNT: 45.2 FL (ref 35.9–50)
GFR SERPL CREATININE-BSD FRML MDRD: >60 ML/MIN/1.73 M 2
GLOBULIN SER CALC-MCNC: 2.8 G/DL (ref 1.9–3.5)
GLUCOSE SERPL-MCNC: 113 MG/DL (ref 65–99)
HCT VFR BLD AUTO: 43.8 % (ref 42–52)
HGB BLD-MCNC: 15.2 G/DL (ref 14–18)
IMM GRANULOCYTES # BLD AUTO: 0.07 K/UL (ref 0–0.11)
IMM GRANULOCYTES NFR BLD AUTO: 0.5 % (ref 0–0.9)
LACTATE BLD-SCNC: 1.3 MMOL/L (ref 0.5–2)
LYMPHOCYTES # BLD AUTO: 2.36 K/UL (ref 1–4.8)
LYMPHOCYTES NFR BLD: 17.5 % (ref 22–41)
MCH RBC QN AUTO: 32.3 PG (ref 27–33)
MCHC RBC AUTO-ENTMCNC: 34.7 G/DL (ref 33.7–35.3)
MCV RBC AUTO: 93 FL (ref 81.4–97.8)
MONOCYTES # BLD AUTO: 0.87 K/UL (ref 0–0.85)
MONOCYTES NFR BLD AUTO: 6.4 % (ref 0–13.4)
NEUTROPHILS # BLD AUTO: 9.61 K/UL (ref 1.82–7.42)
NEUTROPHILS NFR BLD: 71.2 % (ref 44–72)
NRBC # BLD AUTO: 0 K/UL
NRBC BLD AUTO-RTO: 0 /100 WBC
PLATELET # BLD AUTO: 248 K/UL (ref 164–446)
PMV BLD AUTO: 10.2 FL (ref 9–12.9)
POTASSIUM SERPL-SCNC: 4.3 MMOL/L (ref 3.6–5.5)
PROT SERPL-MCNC: 6.5 G/DL (ref 6–8.2)
RBC # BLD AUTO: 4.71 M/UL (ref 4.7–6.1)
SODIUM SERPL-SCNC: 139 MMOL/L (ref 135–145)
WBC # BLD AUTO: 13.5 K/UL (ref 4.8–10.8)

## 2017-10-21 PROCEDURE — 83605 ASSAY OF LACTIC ACID: CPT

## 2017-10-21 PROCEDURE — 700111 HCHG RX REV CODE 636 W/ 250 OVERRIDE (IP): Performed by: EMERGENCY MEDICINE

## 2017-10-21 PROCEDURE — 99285 EMERGENCY DEPT VISIT HI MDM: CPT

## 2017-10-21 PROCEDURE — 700105 HCHG RX REV CODE 258: Performed by: PHARMACIST

## 2017-10-21 PROCEDURE — 36415 COLL VENOUS BLD VENIPUNCTURE: CPT

## 2017-10-21 PROCEDURE — 96376 TX/PRO/DX INJ SAME DRUG ADON: CPT

## 2017-10-21 PROCEDURE — 85025 COMPLETE CBC W/AUTO DIFF WBC: CPT

## 2017-10-21 PROCEDURE — 80053 COMPREHEN METABOLIC PANEL: CPT

## 2017-10-21 PROCEDURE — 96365 THER/PROPH/DIAG IV INF INIT: CPT

## 2017-10-21 PROCEDURE — 99223 1ST HOSP IP/OBS HIGH 75: CPT | Mod: AI | Performed by: HOSPITALIST

## 2017-10-21 PROCEDURE — 93931 UPPER EXTREMITY STUDY: CPT

## 2017-10-21 PROCEDURE — 87040 BLOOD CULTURE FOR BACTERIA: CPT | Mod: 91

## 2017-10-21 PROCEDURE — 770020 HCHG ROOM/CARE - TELE (206)

## 2017-10-21 PROCEDURE — 96366 THER/PROPH/DIAG IV INF ADDON: CPT

## 2017-10-21 PROCEDURE — 96375 TX/PRO/DX INJ NEW DRUG ADDON: CPT

## 2017-10-21 PROCEDURE — 770006 HCHG ROOM/CARE - MED/SURG/GYN SEMI*

## 2017-10-21 PROCEDURE — 700111 HCHG RX REV CODE 636 W/ 250 OVERRIDE (IP): Performed by: PHARMACIST

## 2017-10-21 RX ORDER — MORPHINE SULFATE 4 MG/ML
4 INJECTION, SOLUTION INTRAMUSCULAR; INTRAVENOUS ONCE
Status: COMPLETED | OUTPATIENT
Start: 2017-10-21 | End: 2017-10-21

## 2017-10-21 RX ORDER — BIOTIN 5 MG
1 TABLET ORAL DAILY
Status: DISCONTINUED | OUTPATIENT
Start: 2017-10-22 | End: 2017-10-22

## 2017-10-21 RX ORDER — PRASUGREL 10 MG/1
10 TABLET, FILM COATED ORAL DAILY
Status: DISCONTINUED | OUTPATIENT
Start: 2017-10-22 | End: 2017-10-24 | Stop reason: HOSPADM

## 2017-10-21 RX ORDER — OXYCODONE HYDROCHLORIDE 5 MG/1
5 TABLET ORAL
Status: DISCONTINUED | OUTPATIENT
Start: 2017-10-21 | End: 2017-10-22

## 2017-10-21 RX ORDER — SODIUM CHLORIDE 9 MG/ML
INJECTION, SOLUTION INTRAVENOUS CONTINUOUS
Status: DISCONTINUED | OUTPATIENT
Start: 2017-10-22 | End: 2017-10-24 | Stop reason: HOSPADM

## 2017-10-21 RX ORDER — VITAMIN B COMPLEX
1 TABLET ORAL DAILY
COMMUNITY

## 2017-10-21 RX ORDER — ONDANSETRON 4 MG/1
4 TABLET, ORALLY DISINTEGRATING ORAL EVERY 4 HOURS PRN
Status: DISCONTINUED | OUTPATIENT
Start: 2017-10-21 | End: 2017-10-24 | Stop reason: HOSPADM

## 2017-10-21 RX ORDER — OXYCODONE HYDROCHLORIDE 10 MG/1
10 TABLET ORAL
Status: DISCONTINUED | OUTPATIENT
Start: 2017-10-21 | End: 2017-10-22

## 2017-10-21 RX ORDER — BIOTIN 5 MG
1 TABLET ORAL DAILY
COMMUNITY

## 2017-10-21 RX ORDER — ONDANSETRON 2 MG/ML
4 INJECTION INTRAMUSCULAR; INTRAVENOUS ONCE
Status: COMPLETED | OUTPATIENT
Start: 2017-10-21 | End: 2017-10-21

## 2017-10-21 RX ORDER — MORPHINE SULFATE 15 MG/1
15 TABLET, FILM COATED, EXTENDED RELEASE ORAL
Status: DISCONTINUED | OUTPATIENT
Start: 2017-10-21 | End: 2017-10-22

## 2017-10-21 RX ORDER — OXYCODONE HYDROCHLORIDE 5 MG/1
10 TABLET ORAL 4 TIMES DAILY PRN
Status: DISCONTINUED | OUTPATIENT
Start: 2017-10-21 | End: 2017-10-22

## 2017-10-21 RX ORDER — CARVEDILOL 6.25 MG/1
6.25 TABLET ORAL 2 TIMES DAILY WITH MEALS
Status: DISCONTINUED | OUTPATIENT
Start: 2017-10-22 | End: 2017-10-24 | Stop reason: HOSPADM

## 2017-10-21 RX ORDER — TEMAZEPAM 15 MG/1
15 CAPSULE ORAL
Status: DISCONTINUED | OUTPATIENT
Start: 2017-10-21 | End: 2017-10-24 | Stop reason: HOSPADM

## 2017-10-21 RX ORDER — PROMETHAZINE HYDROCHLORIDE 25 MG/1
12.5-25 TABLET ORAL EVERY 4 HOURS PRN
Status: DISCONTINUED | OUTPATIENT
Start: 2017-10-21 | End: 2017-10-24 | Stop reason: HOSPADM

## 2017-10-21 RX ORDER — POLYETHYLENE GLYCOL 3350 17 G/17G
1 POWDER, FOR SOLUTION ORAL
Status: DISCONTINUED | OUTPATIENT
Start: 2017-10-21 | End: 2017-10-24 | Stop reason: HOSPADM

## 2017-10-21 RX ORDER — ONDANSETRON 2 MG/ML
4 INJECTION INTRAMUSCULAR; INTRAVENOUS EVERY 4 HOURS PRN
Status: DISCONTINUED | OUTPATIENT
Start: 2017-10-21 | End: 2017-10-24 | Stop reason: HOSPADM

## 2017-10-21 RX ORDER — BISACODYL 10 MG
10 SUPPOSITORY, RECTAL RECTAL
Status: DISCONTINUED | OUTPATIENT
Start: 2017-10-21 | End: 2017-10-24 | Stop reason: HOSPADM

## 2017-10-21 RX ORDER — OXYCODONE HYDROCHLORIDE 10 MG/1
10 TABLET ORAL 4 TIMES DAILY PRN
Status: ON HOLD | COMMUNITY
End: 2017-10-24

## 2017-10-21 RX ORDER — UBIDECARENONE 75 MG
100 CAPSULE ORAL DAILY
COMMUNITY

## 2017-10-21 RX ORDER — MORPHINE SULFATE 4 MG/ML
4 INJECTION, SOLUTION INTRAMUSCULAR; INTRAVENOUS
Status: DISCONTINUED | OUTPATIENT
Start: 2017-10-21 | End: 2017-10-22

## 2017-10-21 RX ORDER — MORPHINE SULFATE 15 MG/1
15 TABLET, FILM COATED, EXTENDED RELEASE ORAL
COMMUNITY
End: 2023-07-03

## 2017-10-21 RX ORDER — AMOXICILLIN 250 MG
2 CAPSULE ORAL 2 TIMES DAILY
Status: DISCONTINUED | OUTPATIENT
Start: 2017-10-22 | End: 2017-10-24 | Stop reason: HOSPADM

## 2017-10-21 RX ORDER — AMPICILLIN AND SULBACTAM 2; 1 G/1; G/1
3 INJECTION, POWDER, FOR SOLUTION INTRAMUSCULAR; INTRAVENOUS ONCE
Status: COMPLETED | OUTPATIENT
Start: 2017-10-21 | End: 2017-10-21

## 2017-10-21 RX ORDER — SPIRONOLACTONE 25 MG/1
12.5 TABLET ORAL DAILY
Status: DISCONTINUED | OUTPATIENT
Start: 2017-10-22 | End: 2017-10-24 | Stop reason: HOSPADM

## 2017-10-21 RX ORDER — PROMETHAZINE HYDROCHLORIDE 25 MG/1
12.5-25 SUPPOSITORY RECTAL EVERY 4 HOURS PRN
Status: DISCONTINUED | OUTPATIENT
Start: 2017-10-21 | End: 2017-10-24 | Stop reason: HOSPADM

## 2017-10-21 RX ORDER — ACETAMINOPHEN 325 MG/1
650 TABLET ORAL EVERY 6 HOURS PRN
Status: DISCONTINUED | OUTPATIENT
Start: 2017-10-21 | End: 2017-10-24 | Stop reason: HOSPADM

## 2017-10-21 RX ADMIN — MORPHINE SULFATE 4 MG: 4 INJECTION INTRAVENOUS at 20:05

## 2017-10-21 RX ADMIN — MORPHINE SULFATE 4 MG: 4 INJECTION INTRAVENOUS at 21:14

## 2017-10-21 RX ADMIN — ONDANSETRON 4 MG: 2 INJECTION INTRAMUSCULAR; INTRAVENOUS at 20:05

## 2017-10-21 RX ADMIN — VANCOMYCIN HYDROCHLORIDE 2300 MG: 100 INJECTION, POWDER, LYOPHILIZED, FOR SOLUTION INTRAVENOUS at 22:04

## 2017-10-21 RX ADMIN — AMPICILLIN AND SULBACTAM 3 G: 2; 1 INJECTION, POWDER, FOR SOLUTION INTRAVENOUS at 21:08

## 2017-10-21 ASSESSMENT — PAIN SCALES - GENERAL: PAINLEVEL_OUTOF10: 7

## 2017-10-22 LAB
ALBUMIN SERPL BCP-MCNC: 3.5 G/DL (ref 3.2–4.9)
ALBUMIN/GLOB SERPL: 1.5 G/DL
ALP SERPL-CCNC: 96 U/L (ref 30–99)
ALT SERPL-CCNC: 18 U/L (ref 2–50)
ANION GAP SERPL CALC-SCNC: 7 MMOL/L (ref 0–11.9)
APTT PPP: 26.6 SEC (ref 24.7–36)
APTT PPP: 53.5 SEC (ref 24.7–36)
APTT PPP: 57.5 SEC (ref 24.7–36)
APTT PPP: 60.9 SEC (ref 24.7–36)
AST SERPL-CCNC: 20 U/L (ref 12–45)
BASOPHILS # BLD AUTO: 0.4 % (ref 0–1.8)
BASOPHILS # BLD: 0.05 K/UL (ref 0–0.12)
BILIRUB SERPL-MCNC: 0.4 MG/DL (ref 0.1–1.5)
BUN SERPL-MCNC: 16 MG/DL (ref 8–22)
CALCIUM SERPL-MCNC: 8.6 MG/DL (ref 8.5–10.5)
CHLORIDE SERPL-SCNC: 110 MMOL/L (ref 96–112)
CO2 SERPL-SCNC: 22 MMOL/L (ref 20–33)
CREAT SERPL-MCNC: 0.81 MG/DL (ref 0.5–1.4)
EOSINOPHIL # BLD AUTO: 0.63 K/UL (ref 0–0.51)
EOSINOPHIL NFR BLD: 5.1 % (ref 0–6.9)
ERYTHROCYTE [DISTWIDTH] IN BLOOD BY AUTOMATED COUNT: 46 FL (ref 35.9–50)
GFR SERPL CREATININE-BSD FRML MDRD: >60 ML/MIN/1.73 M 2
GLOBULIN SER CALC-MCNC: 2.4 G/DL (ref 1.9–3.5)
GLUCOSE SERPL-MCNC: 102 MG/DL (ref 65–99)
HCT VFR BLD AUTO: 40.8 % (ref 42–52)
HGB BLD-MCNC: 14.1 G/DL (ref 14–18)
IMM GRANULOCYTES # BLD AUTO: 0.06 K/UL (ref 0–0.11)
IMM GRANULOCYTES NFR BLD AUTO: 0.5 % (ref 0–0.9)
INR PPP: 0.94 (ref 0.87–1.13)
LYMPHOCYTES # BLD AUTO: 2.37 K/UL (ref 1–4.8)
LYMPHOCYTES NFR BLD: 19.3 % (ref 22–41)
MCH RBC QN AUTO: 32.5 PG (ref 27–33)
MCHC RBC AUTO-ENTMCNC: 34.6 G/DL (ref 33.7–35.3)
MCV RBC AUTO: 94 FL (ref 81.4–97.8)
MONOCYTES # BLD AUTO: 0.86 K/UL (ref 0–0.85)
MONOCYTES NFR BLD AUTO: 7 % (ref 0–13.4)
NEUTROPHILS # BLD AUTO: 8.28 K/UL (ref 1.82–7.42)
NEUTROPHILS NFR BLD: 67.7 % (ref 44–72)
NRBC # BLD AUTO: 0 K/UL
NRBC BLD AUTO-RTO: 0 /100 WBC
PLATELET # BLD AUTO: 218 K/UL (ref 164–446)
PMV BLD AUTO: 10.3 FL (ref 9–12.9)
POTASSIUM SERPL-SCNC: 4.4 MMOL/L (ref 3.6–5.5)
PROT SERPL-MCNC: 5.9 G/DL (ref 6–8.2)
PROTHROMBIN TIME: 12.9 SEC (ref 12–14.6)
RBC # BLD AUTO: 4.34 M/UL (ref 4.7–6.1)
SODIUM SERPL-SCNC: 139 MMOL/L (ref 135–145)
WBC # BLD AUTO: 12.3 K/UL (ref 4.8–10.8)

## 2017-10-22 PROCEDURE — 96366 THER/PROPH/DIAG IV INF ADDON: CPT

## 2017-10-22 PROCEDURE — 700105 HCHG RX REV CODE 258

## 2017-10-22 PROCEDURE — 85610 PROTHROMBIN TIME: CPT

## 2017-10-22 PROCEDURE — A9270 NON-COVERED ITEM OR SERVICE: HCPCS | Performed by: INTERNAL MEDICINE

## 2017-10-22 PROCEDURE — 700102 HCHG RX REV CODE 250 W/ 637 OVERRIDE(OP): Performed by: INTERNAL MEDICINE

## 2017-10-22 PROCEDURE — 36415 COLL VENOUS BLD VENIPUNCTURE: CPT

## 2017-10-22 PROCEDURE — 99233 SBSQ HOSP IP/OBS HIGH 50: CPT | Performed by: INTERNAL MEDICINE

## 2017-10-22 PROCEDURE — 85730 THROMBOPLASTIN TIME PARTIAL: CPT

## 2017-10-22 PROCEDURE — 700102 HCHG RX REV CODE 250 W/ 637 OVERRIDE(OP): Performed by: HOSPITALIST

## 2017-10-22 PROCEDURE — 770020 HCHG ROOM/CARE - TELE (206)

## 2017-10-22 PROCEDURE — 80053 COMPREHEN METABOLIC PANEL: CPT

## 2017-10-22 PROCEDURE — 85025 COMPLETE CBC W/AUTO DIFF WBC: CPT

## 2017-10-22 PROCEDURE — 700111 HCHG RX REV CODE 636 W/ 250 OVERRIDE (IP): Performed by: HOSPITALIST

## 2017-10-22 PROCEDURE — 700105 HCHG RX REV CODE 258: Performed by: HOSPITALIST

## 2017-10-22 PROCEDURE — 700111 HCHG RX REV CODE 636 W/ 250 OVERRIDE (IP)

## 2017-10-22 PROCEDURE — 700111 HCHG RX REV CODE 636 W/ 250 OVERRIDE (IP): Performed by: INTERNAL MEDICINE

## 2017-10-22 PROCEDURE — A9270 NON-COVERED ITEM OR SERVICE: HCPCS | Performed by: HOSPITALIST

## 2017-10-22 RX ORDER — CEPHALEXIN 500 MG/1
500 CAPSULE ORAL EVERY 12 HOURS
Status: DISCONTINUED | OUTPATIENT
Start: 2017-10-22 | End: 2017-10-24 | Stop reason: HOSPADM

## 2017-10-22 RX ORDER — MORPHINE SULFATE 15 MG/1
15 TABLET, FILM COATED, EXTENDED RELEASE ORAL EVERY 12 HOURS
Status: DISCONTINUED | OUTPATIENT
Start: 2017-10-22 | End: 2017-10-24

## 2017-10-22 RX ORDER — HYDROMORPHONE HYDROCHLORIDE 4 MG/1
4 TABLET ORAL
Status: DISCONTINUED | OUTPATIENT
Start: 2017-10-22 | End: 2017-10-22

## 2017-10-22 RX ORDER — HYDROMORPHONE HYDROCHLORIDE 2 MG/1
2 TABLET ORAL
Status: DISCONTINUED | OUTPATIENT
Start: 2017-10-22 | End: 2017-10-22

## 2017-10-22 RX ORDER — HEPARIN SODIUM 1000 [USP'U]/ML
3200 INJECTION, SOLUTION INTRAVENOUS; SUBCUTANEOUS PRN
Status: DISCONTINUED | OUTPATIENT
Start: 2017-10-22 | End: 2017-10-24 | Stop reason: HOSPADM

## 2017-10-22 RX ORDER — NICOTINE 21 MG/24HR
1 PATCH, TRANSDERMAL 24 HOURS TRANSDERMAL EVERY 24 HOURS
COMMUNITY
End: 2023-07-07

## 2017-10-22 RX ORDER — NICOTINE 21 MG/24HR
21 PATCH, TRANSDERMAL 24 HOURS TRANSDERMAL
Status: DISCONTINUED | OUTPATIENT
Start: 2017-10-22 | End: 2017-10-24 | Stop reason: HOSPADM

## 2017-10-22 RX ORDER — MORPHINE SULFATE 4 MG/ML
4 INJECTION, SOLUTION INTRAMUSCULAR; INTRAVENOUS
Status: DISCONTINUED | OUTPATIENT
Start: 2017-10-22 | End: 2017-10-23

## 2017-10-22 RX ADMIN — CARVEDILOL 6.25 MG: 6.25 TABLET, FILM COATED ORAL at 09:09

## 2017-10-22 RX ADMIN — VANCOMYCIN HYDROCHLORIDE 1400 MG: 100 INJECTION, POWDER, LYOPHILIZED, FOR SOLUTION INTRAVENOUS at 11:33

## 2017-10-22 RX ADMIN — MORPHINE SULFATE 4 MG: 4 INJECTION INTRAVENOUS at 09:20

## 2017-10-22 RX ADMIN — MORPHINE SULFATE 4 MG: 4 INJECTION INTRAVENOUS at 12:42

## 2017-10-22 RX ADMIN — CEPHALEXIN 500 MG: 500 CAPSULE ORAL at 15:14

## 2017-10-22 RX ADMIN — MORPHINE SULFATE 4 MG: 4 INJECTION INTRAVENOUS at 21:38

## 2017-10-22 RX ADMIN — OXYCODONE HYDROCHLORIDE 10 MG: 10 TABLET ORAL at 04:17

## 2017-10-22 RX ADMIN — HEPARIN SODIUM 1200 UNITS: 5000 INJECTION, SOLUTION INTRAVENOUS at 02:09

## 2017-10-22 RX ADMIN — TEMAZEPAM 15 MG: 15 CAPSULE ORAL at 20:26

## 2017-10-22 RX ADMIN — HEPARIN SODIUM 1200 UNITS/HR: 5000 INJECTION, SOLUTION INTRAVENOUS at 23:45

## 2017-10-22 RX ADMIN — MORPHINE SULFATE 15 MG: 15 TABLET, EXTENDED RELEASE ORAL at 20:22

## 2017-10-22 RX ADMIN — SODIUM CHLORIDE: 9 INJECTION, SOLUTION INTRAVENOUS at 20:23

## 2017-10-22 RX ADMIN — SPIRONOLACTONE 12.5 MG: 25 TABLET, FILM COATED ORAL at 09:09

## 2017-10-22 RX ADMIN — PRASUGREL HYDROCHLORIDE 10 MG: 10 TABLET, FILM COATED ORAL at 09:09

## 2017-10-22 RX ADMIN — STANDARDIZED SENNA CONCENTRATE AND DOCUSATE SODIUM 2 TABLET: 8.6; 5 TABLET, FILM COATED ORAL at 09:10

## 2017-10-22 RX ADMIN — MORPHINE SULFATE 4 MG: 4 INJECTION INTRAVENOUS at 18:45

## 2017-10-22 RX ADMIN — MORPHINE SULFATE 4 MG: 4 INJECTION INTRAVENOUS at 16:49

## 2017-10-22 RX ADMIN — CEPHALEXIN 500 MG: 500 CAPSULE ORAL at 20:22

## 2017-10-22 RX ADMIN — SODIUM CHLORIDE: 9 INJECTION, SOLUTION INTRAVENOUS at 01:06

## 2017-10-22 RX ADMIN — ASPIRIN 81 MG: 81 TABLET, COATED ORAL at 09:09

## 2017-10-22 RX ADMIN — NICOTINE 21 MG: 21 PATCH, EXTENDED RELEASE TRANSDERMAL at 18:45

## 2017-10-22 RX ADMIN — STANDARDIZED SENNA CONCENTRATE AND DOCUSATE SODIUM 2 TABLET: 8.6; 5 TABLET, FILM COATED ORAL at 20:22

## 2017-10-22 RX ADMIN — HYDROMORPHONE HYDROCHLORIDE 4 MG: 4 TABLET ORAL at 14:31

## 2017-10-22 RX ADMIN — CARVEDILOL 6.25 MG: 6.25 TABLET, FILM COATED ORAL at 16:49

## 2017-10-22 RX ADMIN — MORPHINE SULFATE 15 MG: 15 TABLET, EXTENDED RELEASE ORAL at 02:08

## 2017-10-22 ASSESSMENT — PAIN SCALES - GENERAL
PAINLEVEL_OUTOF10: 3
PAINLEVEL_OUTOF10: 8
PAINLEVEL_OUTOF10: 10
PAINLEVEL_OUTOF10: 6
PAINLEVEL_OUTOF10: 6
PAINLEVEL_OUTOF10: 7
PAINLEVEL_OUTOF10: 3
PAINLEVEL_OUTOF10: 10
PAINLEVEL_OUTOF10: 10
PAINLEVEL_OUTOF10: 7
PAINLEVEL_OUTOF10: 9
PAINLEVEL_OUTOF10: 8
PAINLEVEL_OUTOF10: 8
PAINLEVEL_OUTOF10: 9

## 2017-10-22 ASSESSMENT — PATIENT HEALTH QUESTIONNAIRE - PHQ9
SUM OF ALL RESPONSES TO PHQ QUESTIONS 1-9: 0
2. FEELING DOWN, DEPRESSED, IRRITABLE, OR HOPELESS: NOT AT ALL
1. LITTLE INTEREST OR PLEASURE IN DOING THINGS: NOT AT ALL
SUM OF ALL RESPONSES TO PHQ9 QUESTIONS 1 AND 2: 0

## 2017-10-22 ASSESSMENT — LIFESTYLE VARIABLES
ALCOHOL_USE: NO
EVER_SMOKED: YES

## 2017-10-22 NOTE — ED NOTES
Patient to ED triage with complaints of right wrist pain and redness after a cardiac catheterization he had 10/16. He developed redness and pain 10/19. CMS intact to hand, no obvious swelling. Does report chills today.

## 2017-10-22 NOTE — ED PROVIDER NOTES
ED Provider Note    HPI: Patient is a 62-year-old male who presented to the emergency department October 21, 2017 at 4:55 PM with a chief complaint of right arm pain.    Patient had coronary angiogram performed by radial approach a few days ago. He developed redness and pain in the area 2 days ago. [facility] and [facility] him to put ice on it. No imaging was done. The patient has not had a fever chills or cough. Has pain or shortness of breath. No abdominal pain. No other somatic complaints. Patient asked if I would review his cath report is he said no one told him anything about his results.    Review of Systems: Positive for right wrist pain erythema negative for fever chills cough chest pain shortness of breath abdominal pain. Review of systems reviewed the patient, all other systems negative    Past medical/surgical history: Back surgery diverticulitis sleep apnea hypertension bipolar disorder myocardial infarction and recent cardiac stent placement after angiogram pneumonia appendectomy    Medications: Aldactone Lipitor, Coreg Effient Roxicodone    Allergies: None    Social History: No alcohol use patient quit smoking in 2015 30-pack-year history      Physical exam: Constitutional: Pleasant male awake and alert  Vital signs:  Temperature 97.1 pulse 92 respirations 16 blood pressure 147/76 pulse oximetry 94%  EYES: PERRL, EOMI, Conjunctivae and sclera normal, eyelids normal bilaterally.  Neck: Trachea midline. No cervical masses seen or palpated. Normal range of motion, supple. No meningeal signs elicited.  Cardiac: Regular rate and rhythm. S1-S2 present. No S3 or S4 present. No murmurs, rubs, or gallops heard. No edema or varicosities were seen.   Lungs: Clear to auscultation with good aeration throughout. No wheezes, rales, or rhonchi heard. Patient's chest wall moved symmetrically with each respiratory effort. Patient was not making use of accessory muscles of respiration in  breathing.  Abdomen: Soft nontender to palpation. No rebound or guarding elicited. No organomegaly identified. No pulsatile abdominal masses identified.   Musculoskeletal: Pain in the area of a possible infected site on the right wrist. Other than this, No  pain with palpitation or movement of muscle, bone or joint , no obvious musculoskeletal deformities identified.  Neurologic: alert and awake answers questions appropriately. Moves all four extremities independently, no gross focal abnormalities identified. Normal strength and motor.  Skin: Puncture site on right wrist consistent with radial artery cannulization. Surrounding this and running proximally perhaps 3 inches there is an area of erythema consistent with cellulitic change. No drainage is seen. No other rash or lesions seen. No other palpable dermatologic lesion identified. This area is somewhat tender to touch  Psychiatric: Patient was somewhat anxious but not appropriately so. He was not delusional or hallucinating    Medical decision making:  I reviewed the patient's procedural chart. He had a radial artery approach angiogram with cardiac stent placement.    Patient given morphine and Zofran with some improvement.    Laboratory studies were obtained (please see lab sheet for all results) significant findings included elevated white count of 13.5 with a marked preponderance of absolute neutrophils concerning for infection.lactic acid was normal.    Patient given Unasyn and vancomycin after discussion with pharmacy    Doppler arterial study right upper extremity obtained; occlusive thrombus was noted in the radial artery.    Vascular surgery consulted. Dr. Jensen recommended warm compresses to the area. He did not feel there was an emergent intervention indicated.    Patient admitted by hospitalist service for treatment of infection. Patient appears to be stable at this time. Further care and hospital course per attending physician summary    Impression  1) postoperative infection  2) (radial artery thrombosis

## 2017-10-22 NOTE — CARE PLAN
Problem: Infection  Goal: Will remain free from infection  Outcome: PROGRESSING AS EXPECTED  Pt educated on infection prevention, RN used thorough hand hygiene before and after interaction with pt, encouraged pt to participate in thorough hand hygiene as well.     Problem: Pain Management  Goal: Pain level will decrease to patient's comfort goal  Outcome: PROGRESSING AS EXPECTED  RN educated pt on numerical pain rating scale, determined goal of 3/10 pain level, medicated per MAR, offered non-pharmacological pain interventions, frequent reassessment provided.

## 2017-10-22 NOTE — PROGRESS NOTES
Report received from GOMEZ Rivera, pt arrived to unit, tele box placed on pt and confirmed with monitor room, pt oriented to room and updated on POC, call light and personal belongings are in reach, bed alarm and hourly rounding in place.

## 2017-10-22 NOTE — ED NOTES
"Med rec complete per pt at bedside  Allergies reviewed - NKDA  Pt denies ABX in last month  Pt was discharged from this facility on 10/18/17 with new medications. Pt states he just started taking yesterday  These medications are:  Spironolactone 25mg QD  Carvedilol 6.25mg BID  Effient 10mg QD  Also discharged on Lipitor 80mg QD but pt states he has not taken yet because he has had \"muscle tightness\" as a reaction to this medication in the past  [facility] but [facility]  "

## 2017-10-22 NOTE — PROGRESS NOTES
[facility] Hospitalist Progress Note    Date of Service: 10/22/2017    Chief Complaint  62 y.o. male admitted 10/21/2017 with right forearm cellulitis and arterial thrombosis after recent STEMI    Interval Problem Update  Patient continues to complain of pain in his right forearm, redness and swelling are improving. Pt has chronic backpain for which he take oxy and ms contin, will resume. IV morphine for breakthrough. Continue heparin drip. Vitals are stable. On morphine q3 for back pain. Denies chest pain or shortness of breath.     Consultants/Specialty  Vascular surgery Dr. Gonzalez    Disposition  TBD        Review of Systems   Constitutional: Negative for chills and fever.   Respiratory: Negative for cough and shortness of breath.    Musculoskeletal: Positive for back pain.        Right arm pain   All other systems reviewed and are negative.     Physical Exam  Laboratory/Imaging   Hemodynamics  Temp (24hrs), Av.4 °C (97.6 °F), Min:36.2 °C (97.1 °F), Max:36.6 °C (97.9 °F)   Temperature: 36.6 °C (97.9 °F)  Pulse  Av.6  Min: 60  Max: 92    Blood Pressure: 139/73, NIBP: 122/62      Respiratory      Respiration: 18, Pulse Oximetry: 95 %        RUL Breath Sounds: Clear, RML Breath Sounds: Clear, RLL Breath Sounds: Diminished, KATARZYNA Breath Sounds: Clear, LLL Breath Sounds: Diminished    Fluids    Intake/Output Summary (Last 24 hours) at 10/22/17 0947  Last data filed at 10/22/17 0600   Gross per 24 hour   Intake            392.4 ml   Output                0 ml   Net            392.4 ml       Nutrition  Orders Placed This Encounter   Procedures   • Diet Order     Standing Status:   Standing     Number of Occurrences:   1     Order Specific Question:   Diet:     Answer:   Cardiac [6]     Physical Exam   Constitutional: He is oriented to person, place, and time. He appears well-developed and well-nourished. He appears distressed.   HENT:   Head: Normocephalic and atraumatic.   Eyes: Conjunctivae are normal.    Cardiovascular: Normal rate and regular rhythm.    Abdominal: Soft. Bowel sounds are normal. He exhibits no distension. There is no tenderness.   Musculoskeletal: Normal range of motion. He exhibits tenderness (right fore arm).   Neurological: He is alert and oriented to person, place, and time. No cranial nerve deficit. Coordination normal.   Skin: Skin is dry.   Right forearm swelling and redness improved   Psychiatric: He has a normal mood and affect. His behavior is normal.   Nursing note and vitals reviewed.      Recent Labs      10/21/17   2000  10/22/17   0154   WBC  13.5*  12.3*   RBC  4.71  4.34*   HEMOGLOBIN  15.2  14.1   HEMATOCRIT  43.8  40.8*   MCV  93.0  94.0   MCH  32.3  32.5   MCHC  34.7  34.6   RDW  45.2  46.0   PLATELETCT  248  218   MPV  10.2  10.3     Recent Labs      10/21/17   2000  10/22/17   0154   SODIUM  139  139   POTASSIUM  4.3  4.4   CHLORIDE  106  110   CO2  24  22   GLUCOSE  113*  102*   BUN  18  16   CREATININE  0.89  0.81   CALCIUM  9.0  8.6     Recent Labs      10/22/17   0154  10/22/17   0540   APTT  26.6  53.5*   INR  0.94   --                   Assessment/Plan     Chronic back pain   Assessment & Plan    Pain uncontrolled  Continue home regimen  Morphine for breakthrough pain        Arterial thrombosis (CMS-HCC)- (present on admission)   Assessment & Plan    Continue heparin drip  Vascular consult          Cellulitis- (present on admission)   Assessment & Plan    Improving  Continue keflex        CAD (coronary artery disease)- (present on admission)   Assessment & Plan    Stable, no active chest pain  Continue aspirin and effient  Continue statin        Patient plan of care discussed at multidisplinary team rounds, with patient and R.N at beside. I reviewed the above studies including chest x-ray myself      Reviewed items::  Labs reviewed, Medications reviewed and Radiology images reviewed  DVT prophylaxis pharmacological::  Heparin  Antibiotics:  Treating active  infection/contamination beyond 24 hours perioperative coverage

## 2017-10-22 NOTE — RESPIRATORY CARE
COPD EDUCATION by COPD CLINICAL EDUCATOR  10/22/2017 at 6:48 AM by Judi Rodríguez     Patient reviewed by COPD education team. Patient does not qualify for COPD program.  
no loss of consciousness, no gait abnormality, no headache, no sensory deficits, and no weakness.

## 2017-10-22 NOTE — PROGRESS NOTES
"Pharmacy Kinetics 62 y.o. male on vancomycin day # 1 10/22/2017    Vancomycin New Start    2300 mg iv loading dose administered 10/21 @ 2204      Indication for Treatment:   SSTI     Pertinent history per medical record:   Admitted on 10/21/2017 for evaluation of right arm pain.  He underwent coronary angiography and placement of ALLA with radial approach on 10/16/2017. The area became painful and erythematous a couple of days prior to presenting to the ED. Patient denies any fever, chills, NVD, or SOB. [facility], he was told to ice the area without any additional workup done at that time.       Imaging studies:   UE arterial duplex (10/22): Right radial artery has thrombus from the mid forearm to the wrist. The  proximal radial artery is patent to the mid forearm. Collateral filling of the distal radial artery. Patent right ulnar artery.    Other antibiotics: Unasyn 3 gm IV q6h     Allergies:  Review of patient's allergies indicates no known allergies.     List concerns for renal function:   BUN:SCr > 20:1    Pertinent cultures to date:   10/21 blood cxs in process     Recent Labs      10/21/17   2000   WBC  13.5*   NEUTSPOLYS  71.20     Recent Labs      10/21/17   2000   BUN  18   CREATININE  0.89   ALBUMIN  3.7     Blood pressure 147/76, pulse 68, temperature 36.2 °C (97.1 °F), temperature source Temporal, resp. rate 16, height 1.803 m (5' 11\"), weight 91.4 kg (201 lb 8 oz), SpO2 90 %. Temp (24hrs), Av.2 °C (97.1 °F), Min:36.2 °C (97.1 °F), Max:36.2 °C (97.1 °F)      A/P   1. Vancomycin dose change: initiate 1400 mg IV q12h (10, 22)   2. Next vancomycin level: 10/24 @ 0930 (not yet ordered)   3. Goal trough: 12-16  4. Comments: Arterial duplex study demonstrates acute thrombus of right radial artery. Patient without any c/o fever or chills, but +leukocytosis on admission. Vancomycin and Unasyn are to continue empirically for possible SSTI related to coronary angiography. " Limited risk for accumulation of vancomycin and associated toxicity. Will schedule ~15 mg/kg q12h regimen and recommend obtaining steady state trough to ensure sufficient drug clearance and therapeutic trough levels. Pharmacy will continue to monitor and adjust dosing or recommend de-escalation as appropriate.       Saida Centeno, SteveD

## 2017-10-22 NOTE — H&P
DATE OF ADMISSION:  10/21/2017.    IDENTIFICATION:  A 62-year-old male.    PRIMARY CARE PHYSICIAN:  At the [facility].    CHIEF COMPLAINT:  Right hand pain.    HISTORY OF PRESENT ILLNESS:  A 62-year-old male that was recently admitted and   discharged after an ST elevation myocardial infarction.  He actually was   discharged on 10/18.   The patient went home; next day he started to have pain   in his right forearm where the angio site was.  It was red and swollen.  He   developed hand pain.  The patient called the nurse line and was told to put a   heat pack on it and observe it.  The patient reports today to the emergency   room complaining of increased pain.  He is seen by the ER physician here, Dr. Gaspar, who finds a significant reddened area over the puncture site.  An   ultrasound was done and shows an arterial thrombus from the mid-arm to the   forearm.  There is a good residual supply from the ulnar artery.  The case was   presented to the vascular surgeon, Dr. Gonzalez, who recommended warm   compresses and there was no immediate intervention necessary per his report.    The patient will be admitted for pain control, IV antibiotics as well as blood   thinning, the patient agrees.    ALLERGIES:  No known drug allergies.    MEDICATIONS:  Prescription medication regimen is as follows:  1.  Arginine p.o. daily.  2.  Aspirin 81 mg p.o. daily.  3.  Vitamin B complex daily.  4.  Coreg 6.25 mg p.o. b.i.d.  5.  Cholecalciferol vitamin D one tablet p.o. daily.  6.  Vitamin B12 100 mcg daily.  7.  Krill oil 1000 mg daily.  8.  MS Contin 15 mg ER at night p.r.n.  9.  Oxycodone 10 mg 4 times daily p.r.n. pain.  10.  Effient 10 mg p.o. daily.  11.  Aldactone 25 mg half tablet p.o. daily.    PAST MEDICAL HISTORY:  1.  History of myocardial infarction x3 now, 2007, 2015, and 2017; coronary   artery disease with prior stenting x4 now, stent also in the LAD position.    The patient recently admitted and diagnosed with  an ST elevation myocardial   infarction.  The patient's echocardiogram shows an ejection fraction of 55%.  2.  Tobacco abuse.  3.  Dyslipidemia.  4.  History of obstructive sleep apnea.  5.  Hypertension.  6.  Asthma.    PAST SURGICAL HISTORY:  1.  History of lumbar fusions and surgeries x7.  2.  Appendectomy.  3.  History of colonoscopy.  4.  Tobacco use positive.    SOCIAL HISTORY:  He says since he got out of the hospital, he has not smoked.    Alcohol, none.  He does smoke some marijuana.  The patient is .  He   is a retired .  No alcohol.  No illicit drugs.    FAMILY HISTORY:  He had positive myocardial infarction in the 40s in his   grandfather.    REVIEW OF SYSTEMS:  Negative for AMA and CMS criteria other than outlined in   the HPI.    PHYSICAL EXAMINATION:  VITAL SIGNS:  The patient is found to have temperature 36.2, pulse 68,   respirations 16, blood pressure 147/76.  Patient is saturating 95% on room   air.  GENERAL:  This is a pleasant  male, in no acute distress.  Negative   respiratory distress.  Well developed, well nourished.  HEENT:  Normocephalic, atraumatic.  EOMI.  PERRLA.  Mucous membranes are   moist.  Nasopharynx is clear.  NECK:  Stiff range of motion.  No lymphadenopathy or thyromegaly.  CHEST:  Normal bony structures.  LUNGS:  Clear to auscultation anteriorly.  HEART:  Regular rate.  S1 and S2 appear normal.  No S3, S4.  ABDOMEN:  Somewhat protuberant, but no tenderness.  No distention.  GENITOURINARY:  Normal external male genitalia.  RECTAL:  Exam deferred.  MUSCULOSKELETAL:  No clubbing, cyanosis.  No edema.  NEUROLOGIC:  The patient is alert and oriented x4.  Cranial nerves II-XII are   grossly intact.  SKIN:  Right forearm shows some induration and redness over the angio site.    The radial pulse is absent.  Ulnar pulses are faintly palpated.  Capillary   refill is positive.  There is no fluctuation underneath the erythematous area   to suggest  abscess.  No other skin lesions are noted.    LABORATORY DATA AND IMAGING:  As follows:  White cell count 13.5, hemoglobin   15.2, platelet count is 248, and neutrophils 71.  Chemistry is essentially   normal except for glucose of 113, alkaline phosphatase is 108.  Lactic acid is   negative.  An EKG shows sinus rhythm, rate of 63 without acute ST-T changes,   does have residual changes from his recent myocardial infarction, including   T-wave abnormalities.  Arterial duplex of the right upper extremity was read   as right radial artery thrombosis from the mid forearm to the wrist.  The   proximal radial artery is patent to the mid forearm, collateral filling of the   distal radial artery, patent right ulnar artery.    ASSESSMENT AND PLAN:  1.  Right arm cellulitis from intervention.  The patient has been placed on   vancomycin.  He will be treated conservatively with heat packs and pain   control.  We will obtain blood cultures.  2.  Arterial thrombosis on the right secondary to recent instrumentation.  The   patient has been referred to Dr. Gonzalez from vascular surgery who does not   indicate acute intervention needs.  The patient will be placed on heparin drip   to facilitate hopefully recanalization.  3.  Coronary artery disease with history of recent stent placement.  4.  Tobacco abuse.  5.  History of congestive heart failure.  6.  Dyslipidemia.  7.  Obstructive sleep apnea.  8.  Asthma.    OVERALL PLAN:  The patient at this time is admitted for pain control, IV   antibiotics, cultures and anticoagulation for right arterial thrombus   following a right angiography for a STEMI.    Time spent on this admission is 55 minutes.  The patient will require 2+   overnights stay.  He will be admitted to telemetry in an acute fashion.       ____________________________________     MD YUMIKO KENNY / BINU    DD:  10/21/2017 23:45:35  DT:  10/22/2017 03:37:52    D#:  [id number]  Job#:  [id number]

## 2017-10-22 NOTE — PROGRESS NOTES
"Paged Dr. Puentes as pt c/o pf pain, states \"last pain med didn't do a thing\"., waiting for call back.    1614 :  returned call, see new orders.   "

## 2017-10-22 NOTE — PROGRESS NOTES
PT refusing bed alarm, uses profane language when it goes off, then turns it off. Pt educated on importance of calling for assistance, refuses to do so, using profane language.

## 2017-10-23 PROBLEM — G89.29 CHRONIC BACK PAIN: Status: ACTIVE | Noted: 2017-10-23

## 2017-10-23 PROBLEM — M54.9 CHRONIC BACK PAIN: Status: ACTIVE | Noted: 2017-10-23

## 2017-10-23 LAB — APTT PPP: 65 SEC (ref 24.7–36)

## 2017-10-23 PROCEDURE — 700111 HCHG RX REV CODE 636 W/ 250 OVERRIDE (IP)

## 2017-10-23 PROCEDURE — 700102 HCHG RX REV CODE 250 W/ 637 OVERRIDE(OP): Performed by: INTERNAL MEDICINE

## 2017-10-23 PROCEDURE — 85730 THROMBOPLASTIN TIME PARTIAL: CPT

## 2017-10-23 PROCEDURE — 700111 HCHG RX REV CODE 636 W/ 250 OVERRIDE (IP): Performed by: INTERNAL MEDICINE

## 2017-10-23 PROCEDURE — A9270 NON-COVERED ITEM OR SERVICE: HCPCS | Performed by: HOSPITALIST

## 2017-10-23 PROCEDURE — 99233 SBSQ HOSP IP/OBS HIGH 50: CPT | Performed by: INTERNAL MEDICINE

## 2017-10-23 PROCEDURE — 36415 COLL VENOUS BLD VENIPUNCTURE: CPT

## 2017-10-23 PROCEDURE — 700102 HCHG RX REV CODE 250 W/ 637 OVERRIDE(OP): Performed by: HOSPITALIST

## 2017-10-23 PROCEDURE — A9270 NON-COVERED ITEM OR SERVICE: HCPCS | Performed by: INTERNAL MEDICINE

## 2017-10-23 PROCEDURE — 770020 HCHG ROOM/CARE - TELE (206)

## 2017-10-23 RX ORDER — MORPHINE SULFATE 4 MG/ML
4 INJECTION, SOLUTION INTRAMUSCULAR; INTRAVENOUS EVERY 4 HOURS PRN
Status: DISCONTINUED | OUTPATIENT
Start: 2017-10-23 | End: 2017-10-24 | Stop reason: HOSPADM

## 2017-10-23 RX ADMIN — STANDARDIZED SENNA CONCENTRATE AND DOCUSATE SODIUM 2 TABLET: 8.6; 5 TABLET, FILM COATED ORAL at 20:25

## 2017-10-23 RX ADMIN — STANDARDIZED SENNA CONCENTRATE AND DOCUSATE SODIUM 2 TABLET: 8.6; 5 TABLET, FILM COATED ORAL at 09:05

## 2017-10-23 RX ADMIN — MORPHINE SULFATE 4 MG: 4 INJECTION INTRAVENOUS at 10:23

## 2017-10-23 RX ADMIN — CEPHALEXIN 500 MG: 500 CAPSULE ORAL at 09:05

## 2017-10-23 RX ADMIN — SPIRONOLACTONE 12.5 MG: 25 TABLET, FILM COATED ORAL at 09:06

## 2017-10-23 RX ADMIN — MORPHINE SULFATE 15 MG: 15 TABLET, EXTENDED RELEASE ORAL at 09:06

## 2017-10-23 RX ADMIN — NICOTINE 21 MG: 21 PATCH, EXTENDED RELEASE TRANSDERMAL at 06:07

## 2017-10-23 RX ADMIN — PRASUGREL HYDROCHLORIDE 10 MG: 10 TABLET, FILM COATED ORAL at 09:06

## 2017-10-23 RX ADMIN — CARVEDILOL 6.25 MG: 6.25 TABLET, FILM COATED ORAL at 09:06

## 2017-10-23 RX ADMIN — CARVEDILOL 6.25 MG: 6.25 TABLET, FILM COATED ORAL at 17:38

## 2017-10-23 RX ADMIN — MORPHINE SULFATE 4 MG: 4 INJECTION INTRAVENOUS at 01:28

## 2017-10-23 RX ADMIN — MORPHINE SULFATE 4 MG: 4 INJECTION INTRAVENOUS at 17:38

## 2017-10-23 RX ADMIN — ASPIRIN 81 MG: 81 TABLET, COATED ORAL at 09:06

## 2017-10-23 RX ADMIN — MORPHINE SULFATE 15 MG: 15 TABLET, EXTENDED RELEASE ORAL at 20:25

## 2017-10-23 RX ADMIN — CEPHALEXIN 500 MG: 500 CAPSULE ORAL at 20:25

## 2017-10-23 RX ADMIN — HEPARIN SODIUM 1200 UNITS/HR: 5000 INJECTION, SOLUTION INTRAVENOUS at 20:25

## 2017-10-23 RX ADMIN — MORPHINE SULFATE 4 MG: 4 INJECTION INTRAVENOUS at 06:37

## 2017-10-23 RX ADMIN — MORPHINE SULFATE 4 MG: 4 INJECTION INTRAVENOUS at 22:03

## 2017-10-23 ASSESSMENT — ENCOUNTER SYMPTOMS
BACK PAIN: 1
FEVER: 0
COUGH: 0
SPUTUM PRODUCTION: 0
CHILLS: 0
SHORTNESS OF BREATH: 0

## 2017-10-23 ASSESSMENT — LIFESTYLE VARIABLES: DO YOU DRINK ALCOHOL: NO

## 2017-10-23 ASSESSMENT — PAIN SCALES - GENERAL
PAINLEVEL_OUTOF10: 6
PAINLEVEL_OUTOF10: 7
PAINLEVEL_OUTOF10: 4
PAINLEVEL_OUTOF10: 0
PAINLEVEL_OUTOF10: 6
PAINLEVEL_OUTOF10: 2
PAINLEVEL_OUTOF10: 5

## 2017-10-23 ASSESSMENT — PATIENT HEALTH QUESTIONNAIRE - PHQ9
SUM OF ALL RESPONSES TO PHQ9 QUESTIONS 1 AND 2: 0
1. LITTLE INTEREST OR PLEASURE IN DOING THINGS: NOT AT ALL
2. FEELING DOWN, DEPRESSED, IRRITABLE, OR HOPELESS: NOT AT ALL
SUM OF ALL RESPONSES TO PHQ QUESTIONS 1-9: 0

## 2017-10-23 NOTE — PROGRESS NOTES
Pt reports he takes nicotine patch at home, requesting on now, denies any other needs, paged MD.     1718: MD returned page, see new orders.

## 2017-10-23 NOTE — PROGRESS NOTES
[facility] Hospitalist Progress Note    Date of Service: 10/23/2017    Chief Complaint  62 y.o. male admitted 10/21/2017 with right forearm cellulitis and arterial thrombosis after recent STEMI    Interval Problem Update  Patient continues to complain of pain in his right forearm, redness and swelling are improving. Pt has chronic backpain for which he take oxy and ms contin, will resume. IV morphine for breakthrough. Continue heparin drip. Vitals are stable. On morphine q3 for back pain. Denies chest pain or shortness of breath.     10/23 Pain is better controlled. Swelling and redness have improved. Will start on warfarin     Consultants/Specialty  Vascular surgery Dr. Gonzalez    Disposition  TBD        Review of Systems   Constitutional: Negative for chills and fever.   Respiratory: Negative for cough, sputum production and shortness of breath.    Musculoskeletal: Positive for back pain.        Right arm pain   All other systems reviewed and are negative.     Physical Exam  Laboratory/Imaging   Hemodynamics  Temp (24hrs), Av.6 °C (97.9 °F), Min:36.2 °C (97.2 °F), Max:36.7 °C (98.1 °F)   Temperature: 36.7 °C (98.1 °F)  Pulse  Av.7  Min: 57  Max: 92    Blood Pressure: 137/79      Respiratory      Respiration: 17, Pulse Oximetry: 99 %             Fluids    Intake/Output Summary (Last 24 hours) at 10/23/17 0955  Last data filed at 10/23/17 0601   Gross per 24 hour   Intake                0 ml   Output             2100 ml   Net            -2100 ml       Nutrition  Orders Placed This Encounter   Procedures   • Diet Order     Standing Status:   Standing     Number of Occurrences:   1     Order Specific Question:   Diet:     Answer:   Cardiac [6]     Physical Exam   Constitutional: He is oriented to person, place, and time. He appears well-developed and well-nourished. He appears distressed.   HENT:   Head: Normocephalic and atraumatic.   Eyes: Conjunctivae are normal.   Cardiovascular: Normal rate, regular rhythm  and intact distal pulses.    Right arm pulse intact with good skin perfusion   Abdominal: Soft. Bowel sounds are normal. He exhibits no distension. There is no tenderness.   Musculoskeletal: Normal range of motion. He exhibits tenderness (right fore arm).   Neurological: He is alert and oriented to person, place, and time. No cranial nerve deficit. Coordination normal.   Skin: Skin is warm and dry.   Right forearm swelling and redness improved     Psychiatric: He has a normal mood and affect. His behavior is normal.   Nursing note and vitals reviewed.      Recent Labs      10/21/17   2000  10/22/17   0154   WBC  13.5*  12.3*   RBC  4.71  4.34*   HEMOGLOBIN  15.2  14.1   HEMATOCRIT  43.8  40.8*   MCV  93.0  94.0   MCH  32.3  32.5   MCHC  34.7  34.6   RDW  45.2  46.0   PLATELETCT  248  218   MPV  10.2  10.3     Recent Labs      10/21/17   2000  10/22/17   0154   SODIUM  139  139   POTASSIUM  4.3  4.4   CHLORIDE  106  110   CO2  24  22   GLUCOSE  113*  102*   BUN  18  16   CREATININE  0.89  0.81   CALCIUM  9.0  8.6     Recent Labs      10/22/17   0154   10/22/17   0913  10/22/17   1505  10/23/17   0404   APTT  26.6   < >  60.9*  57.5*  65.0*   INR  0.94   --    --    --    --     < > = values in this interval not displayed.                  Assessment/Plan     Chronic back pain- (present on admission)   Assessment & Plan    Pain uncontrolled  Continue home regimen  Morphine for breakthrough pain        Arterial thrombosis (CMS-HCC)- (present on admission)   Assessment & Plan    Right radial artery thrombus from the mid forearm to the wrist likely secondary to recent cardiac catheterization  No evidence of limb ischemia  Evaluated by vascular surgery in the ER who recommended no urgent intervention  Continue heparin drip, monitor APTT and adjust per protocol  Continue dual antiplatelets  Awaiting further recommendations from vascular surgery          Cellulitis- (present on admission)   Assessment & Plan     Improving  Continue keflex        CAD (coronary artery disease)- (present on admission)   Assessment & Plan    Stable, no active chest pain  Continue aspirin and effient  Continue statin        Patient plan of care discussed at multidisplinary team rounds, with patient and R.N at beside.       Reviewed items::  Labs reviewed, Medications reviewed and Radiology images reviewed  DVT prophylaxis pharmacological::  Heparin  Antibiotics:  Treating active infection/contamination beyond 24 hours perioperative coverage

## 2017-10-23 NOTE — CARE PLAN
Problem: Safety  Goal: Will remain free from falls    Intervention: Implement fall precautions  Pt refusing bed alarm, refusing treaded socks, educated pt on importance.

## 2017-10-24 VITALS
BODY MASS INDEX: 28.18 KG/M2 | TEMPERATURE: 97.2 F | HEIGHT: 71 IN | RESPIRATION RATE: 18 BRPM | DIASTOLIC BLOOD PRESSURE: 72 MMHG | WEIGHT: 201.28 LBS | HEART RATE: 58 BPM | OXYGEN SATURATION: 95 % | SYSTOLIC BLOOD PRESSURE: 138 MMHG

## 2017-10-24 PROBLEM — L03.90 CELLULITIS: Status: RESOLVED | Noted: 2017-10-21 | Resolved: 2017-10-24

## 2017-10-24 LAB
ANION GAP SERPL CALC-SCNC: 7 MMOL/L (ref 0–11.9)
APTT PPP: 56.8 SEC (ref 24.7–36)
BASOPHILS # BLD AUTO: 0.5 % (ref 0–1.8)
BASOPHILS # BLD: 0.06 K/UL (ref 0–0.12)
BUN SERPL-MCNC: 14 MG/DL (ref 8–22)
CALCIUM SERPL-MCNC: 9 MG/DL (ref 8.5–10.5)
CHLORIDE SERPL-SCNC: 111 MMOL/L (ref 96–112)
CO2 SERPL-SCNC: 22 MMOL/L (ref 20–33)
CREAT SERPL-MCNC: 0.67 MG/DL (ref 0.5–1.4)
EOSINOPHIL # BLD AUTO: 0.4 K/UL (ref 0–0.51)
EOSINOPHIL NFR BLD: 3.4 % (ref 0–6.9)
ERYTHROCYTE [DISTWIDTH] IN BLOOD BY AUTOMATED COUNT: 44.6 FL (ref 35.9–50)
GFR SERPL CREATININE-BSD FRML MDRD: >60 ML/MIN/1.73 M 2
GLUCOSE SERPL-MCNC: 101 MG/DL (ref 65–99)
HCT VFR BLD AUTO: 44 % (ref 42–52)
HGB BLD-MCNC: 15 G/DL (ref 14–18)
IMM GRANULOCYTES # BLD AUTO: 0.06 K/UL (ref 0–0.11)
IMM GRANULOCYTES NFR BLD AUTO: 0.5 % (ref 0–0.9)
LYMPHOCYTES # BLD AUTO: 2.37 K/UL (ref 1–4.8)
LYMPHOCYTES NFR BLD: 20.2 % (ref 22–41)
MCH RBC QN AUTO: 31.8 PG (ref 27–33)
MCHC RBC AUTO-ENTMCNC: 34.1 G/DL (ref 33.7–35.3)
MCV RBC AUTO: 93.4 FL (ref 81.4–97.8)
MONOCYTES # BLD AUTO: 0.82 K/UL (ref 0–0.85)
MONOCYTES NFR BLD AUTO: 7 % (ref 0–13.4)
NEUTROPHILS # BLD AUTO: 8.03 K/UL (ref 1.82–7.42)
NEUTROPHILS NFR BLD: 68.4 % (ref 44–72)
NRBC # BLD AUTO: 0 K/UL
NRBC BLD AUTO-RTO: 0 /100 WBC
PLATELET # BLD AUTO: 241 K/UL (ref 164–446)
PMV BLD AUTO: 10.3 FL (ref 9–12.9)
POTASSIUM SERPL-SCNC: 4.1 MMOL/L (ref 3.6–5.5)
RBC # BLD AUTO: 4.71 M/UL (ref 4.7–6.1)
SODIUM SERPL-SCNC: 140 MMOL/L (ref 135–145)
WBC # BLD AUTO: 11.7 K/UL (ref 4.8–10.8)

## 2017-10-24 PROCEDURE — 85025 COMPLETE CBC W/AUTO DIFF WBC: CPT

## 2017-10-24 PROCEDURE — 99239 HOSP IP/OBS DSCHRG MGMT >30: CPT | Performed by: INTERNAL MEDICINE

## 2017-10-24 PROCEDURE — 80048 BASIC METABOLIC PNL TOTAL CA: CPT

## 2017-10-24 PROCEDURE — 700102 HCHG RX REV CODE 250 W/ 637 OVERRIDE(OP): Performed by: HOSPITALIST

## 2017-10-24 PROCEDURE — 700111 HCHG RX REV CODE 636 W/ 250 OVERRIDE (IP): Performed by: INTERNAL MEDICINE

## 2017-10-24 PROCEDURE — 36415 COLL VENOUS BLD VENIPUNCTURE: CPT

## 2017-10-24 PROCEDURE — 85730 THROMBOPLASTIN TIME PARTIAL: CPT

## 2017-10-24 PROCEDURE — 700102 HCHG RX REV CODE 250 W/ 637 OVERRIDE(OP): Performed by: INTERNAL MEDICINE

## 2017-10-24 PROCEDURE — A9270 NON-COVERED ITEM OR SERVICE: HCPCS | Performed by: HOSPITALIST

## 2017-10-24 PROCEDURE — A9270 NON-COVERED ITEM OR SERVICE: HCPCS | Performed by: INTERNAL MEDICINE

## 2017-10-24 RX ORDER — CEPHALEXIN 500 MG/1
500 CAPSULE ORAL EVERY 12 HOURS
Qty: 6 CAP | Refills: 0 | Status: SHIPPED | OUTPATIENT
Start: 2017-10-24 | End: 2017-10-27

## 2017-10-24 RX ORDER — OXYCODONE HYDROCHLORIDE 10 MG/1
10 TABLET ORAL 4 TIMES DAILY PRN
Qty: 25 TAB | Refills: 0 | Status: SHIPPED | OUTPATIENT
Start: 2017-10-24 | End: 2023-07-03

## 2017-10-24 RX ORDER — OXYCODONE HYDROCHLORIDE 10 MG/1
10 TABLET ORAL EVERY 4 HOURS PRN
Status: DISCONTINUED | OUTPATIENT
Start: 2017-10-24 | End: 2017-10-24 | Stop reason: HOSPADM

## 2017-10-24 RX ADMIN — CEPHALEXIN 500 MG: 500 CAPSULE ORAL at 08:55

## 2017-10-24 RX ADMIN — MORPHINE SULFATE 4 MG: 4 INJECTION INTRAVENOUS at 03:21

## 2017-10-24 RX ADMIN — OXYCODONE HYDROCHLORIDE 10 MG: 10 TABLET ORAL at 12:05

## 2017-10-24 RX ADMIN — PRASUGREL HYDROCHLORIDE 10 MG: 10 TABLET, FILM COATED ORAL at 08:49

## 2017-10-24 RX ADMIN — NICOTINE 21 MG: 21 PATCH, EXTENDED RELEASE TRANSDERMAL at 06:02

## 2017-10-24 RX ADMIN — ASPIRIN 81 MG: 81 TABLET, COATED ORAL at 08:48

## 2017-10-24 RX ADMIN — CARVEDILOL 6.25 MG: 6.25 TABLET, FILM COATED ORAL at 08:48

## 2017-10-24 RX ADMIN — SPIRONOLACTONE 12.5 MG: 25 TABLET, FILM COATED ORAL at 08:48

## 2017-10-24 RX ADMIN — MORPHINE SULFATE 4 MG: 4 INJECTION INTRAVENOUS at 08:55

## 2017-10-24 ASSESSMENT — PATIENT HEALTH QUESTIONNAIRE - PHQ9
SUM OF ALL RESPONSES TO PHQ9 QUESTIONS 1 AND 2: 0
2. FEELING DOWN, DEPRESSED, IRRITABLE, OR HOPELESS: NOT AT ALL
1. LITTLE INTEREST OR PLEASURE IN DOING THINGS: NOT AT ALL
SUM OF ALL RESPONSES TO PHQ QUESTIONS 1-9: 0

## 2017-10-24 ASSESSMENT — ENCOUNTER SYMPTOMS
BACK PAIN: 1
FEVER: 0
SPUTUM PRODUCTION: 0
COUGH: 0
CHILLS: 0
SHORTNESS OF BREATH: 0

## 2017-10-24 ASSESSMENT — PAIN SCALES - GENERAL
PAINLEVEL_OUTOF10: 7
PAINLEVEL_OUTOF10: 0
PAINLEVEL_OUTOF10: 5

## 2017-10-24 ASSESSMENT — LIFESTYLE VARIABLES: EVER_SMOKED: NEVER

## 2017-10-24 NOTE — PROGRESS NOTES
[facility] Hospitalist Progress Note    Date of Service: 10/24/2017    Chief Complaint  62 y.o. male admitted 10/21/2017 with right forearm cellulitis and arterial thrombosis after recent STEMI    Interval Problem Update  Patient continues to complain of pain in his right forearm, redness and swelling are improving. Pt has chronic backpain for which he take oxy and ms contin, will resume. IV morphine for breakthrough. Continue heparin drip. Vitals are stable. On morphine q3 for back pain. Denies chest pain or shortness of breath.     10/23 Pain is better controlled. Swelling and redness have improved. Will start on warfarin     Consultants/Specialty  Vascular surgery Dr. Gonzalez    Disposition  TBD        Review of Systems   Constitutional: Negative for chills and fever.   Respiratory: Negative for cough, sputum production and shortness of breath.    Musculoskeletal: Positive for back pain.        Right arm pain   All other systems reviewed and are negative.     Physical Exam  Laboratory/Imaging   Hemodynamics  Temp (24hrs), Av.8 °C (98.2 °F), Min:36.2 °C (97.2 °F), Max:37.3 °C (99.1 °F)   Temperature: 36.2 °C (97.2 °F)  Pulse  Av.5  Min: 56  Max: 92    Blood Pressure: 138/72      Respiratory      Respiration: 18, Pulse Oximetry: 95 %        RUL Breath Sounds: Clear, RML Breath Sounds: Clear, RLL Breath Sounds: Clear, KATARZYNA Breath Sounds: Clear, LLL Breath Sounds: Clear    Fluids    Intake/Output Summary (Last 24 hours) at 10/24/17 0933  Last data filed at 10/24/17 0600   Gross per 24 hour   Intake             1224 ml   Output              700 ml   Net              524 ml       Nutrition  Orders Placed This Encounter   Procedures   • Diet Order     Standing Status:   Standing     Number of Occurrences:   1     Order Specific Question:   Diet:     Answer:   Cardiac [6]     Physical Exam   Constitutional: He is oriented to person, place, and time. He appears well-developed and well-nourished. He appears distressed.    HENT:   Head: Normocephalic and atraumatic.   Eyes: Conjunctivae are normal.   Cardiovascular: Normal rate, regular rhythm and intact distal pulses.    Right arm pulse intact with good skin perfusion   Abdominal: Soft. Bowel sounds are normal. He exhibits no distension. There is no tenderness.   Musculoskeletal: Normal range of motion. He exhibits tenderness (right fore arm).   Neurological: He is alert and oriented to person, place, and time. No cranial nerve deficit. Coordination normal.   Skin: Skin is warm and dry.   Right forearm swelling and redness improved     Psychiatric: He has a normal mood and affect. His behavior is normal.   Nursing note and vitals reviewed.      Recent Labs      10/21/17   2000  10/22/17   0154  10/24/17   0351   WBC  13.5*  12.3*  11.7*   RBC  4.71  4.34*  4.71   HEMOGLOBIN  15.2  14.1  15.0   HEMATOCRIT  43.8  40.8*  44.0   MCV  93.0  94.0  93.4   MCH  32.3  32.5  31.8   MCHC  34.7  34.6  34.1   RDW  45.2  46.0  44.6   PLATELETCT  248  218  241   MPV  10.2  10.3  10.3     Recent Labs      10/21/17   2000  10/22/17   0154  10/24/17   0351   SODIUM  139  139  140   POTASSIUM  4.3  4.4  4.1   CHLORIDE  106  110  111   CO2  24  22  22   GLUCOSE  113*  102*  101*   BUN  18  16  14   CREATININE  0.89  0.81  0.67   CALCIUM  9.0  8.6  9.0     Recent Labs      10/22/17   0154   10/22/17   1505  10/23/17   0404  10/24/17   0519   APTT  26.6   < >  57.5*  65.0*  56.8*   INR  0.94   --    --    --    --     < > = values in this interval not displayed.                  Assessment/Plan     Chronic back pain- (present on admission)   Assessment & Plan    Pain uncontrolled  Continue home regimen  Morphine for breakthrough pain        Arterial thrombosis (CMS-HCC)- (present on admission)   Assessment & Plan    Right radial artery thrombus from the mid forearm to the wrist likely secondary to recent cardiac catheterization  No evidence of limb ischemia  Evaluated by vascular surgery in the ER who  recommended no urgent intervention  Continue heparin drip, monitor APTT and adjust per protocol  Continue dual antiplatelets  Awaiting further recommendations from vascular surgery          Cellulitis- (present on admission)   Assessment & Plan    Improving  Continue keflex        CAD (coronary artery disease)- (present on admission)   Assessment & Plan    Stable, no active chest pain  Continue aspirin and effient  Continue statin        Patient plan of care discussed at multidisplinary team rounds, with patient and R.N at beside.       Reviewed items::  Labs reviewed, Medications reviewed and Radiology images reviewed  DVT prophylaxis pharmacological::  Heparin  Antibiotics:  Treating active infection/contamination beyond 24 hours perioperative coverage

## 2017-10-24 NOTE — DISCHARGE INSTRUCTIONS
Discharge Instructions    Discharged to home by car with self. Discharged via walking, hospital escort: Refused.  Special equipment needed: Not Applicable    Be sure to schedule a follow-up appointment with your primary care doctor or any specialists as instructed.     Discharge Plan:   Diet Plan: Discussed  Activity Level: Discussed  Smoking Cessation Offered: Patient Refused  Confirmed Follow up Appointment: Patient to Call and Schedule Appointment  Confirmed Symptoms Management: Discussed  Medication Reconciliation Updated: Yes  Influenza Vaccine Indication: Patient Refuses    I understand that a diet low in cholesterol, fat, and sodium is recommended for good health. Unless I have been given specific instructions below for another diet, I accept this instruction as my diet prescription.   Other diet: cardiac    Special Instructions: None    · Is patient discharged on Warfarin / Coumadin?   No     · Is patient Post Blood Transfusion?  No    Depression / Suicide Risk    As you are discharged from this [facility] facility, it is important to learn how to keep safe from harming yourself.    Recognize the warning signs:  · Abrupt changes in personality, positive or negative- including increase in energy   · Giving away possessions  · Change in eating patterns- significant weight changes-  positive or negative  · Change in sleeping patterns- unable to sleep or sleeping all the time   · Unwillingness or inability to communicate  · Depression  · Unusual sadness, discouragement and loneliness  · Talk of wanting to die  · Neglect of personal appearance   · Rebelliousness- reckless behavior  · Withdrawal from people/activities they love  · Confusion- inability to concentrate     If you or a loved one observes any of these behaviors or has concerns about self-harm, here's what you can do:  · Talk about it- your feelings and reasons for harming yourself  · Remove any means that you might use to hurt yourself (examples:  pills, rope, extension cords, firearm)  · Get professional help from the community (Mental Health, Substance Abuse, psychological counseling)  · Do not be alone:Call your Safe Contact- someone whom you trust who will be there for you.  · Call your local CRISIS HOTLINE 885-1829 or 261-507-1242  · Call your local Children's Mobile Crisis Response Team [place] (327) 141-4307 or www.Helium  · Call the toll free National Suicide Prevention Hotlines   · National Suicide Prevention Lifeline 680-139-YDRJ (2000)  · National Hope Line Network 800-SUICIDE (510-6659)

## 2017-10-25 ENCOUNTER — PATIENT OUTREACH (OUTPATIENT)
Dept: HEALTH INFORMATION MANAGEMENT | Facility: OTHER | Age: 62
End: 2017-10-25

## 2017-10-25 NOTE — DISCHARGE SUMMARY
CHIEF COMPLAINT ON ADMISSION  Chief Complaint   Patient presents with   • Post-Op Complications   • Arm Pain       CODE STATUS  Prior    HPI & HOSPITAL COURSE  Please review H&P for details on admission.   This is a 62 y.o. male here withPast medical history of CAD and recent STEMI status post PCI with ALLA placement presented with right forearm pain and swelling. Urinary underwent an ultrasound duplex that revealed Right radial artery has thrombus from the mid forearm to the wrist. The proximal radial artery is patent to the mid forearm. Collateral filling of the distal radial artery. Patent right ulnar artery. No evidence of ischemic limb, the patient had good pulses and perfusion for a right upper extremity. The patient was started on IV heparin was continued on aspirin and Effient. The patient was evaluated by vascular surgery Dr. Bragg who recommended no surgical intervention. Patient was started on Keflex for concern of cellulitis. Following day his pain and swelling improved. The case was discussed with vascular surgery who recommended discontinuing the IV heparin drip and conservative management with warm compresses and continuing dual antiplatelet therapy. The patient was instructed to follow up with cardiology in clinic in 1 week. The patient was instructed to return to the ER if he developed worsening pain or discoloration of his extremity.     Therefore, he is discharged in fair and stable condition with close outpatient follow-up.    SPECIFIC OUTPATIENT FOLLOW-UP  Follow up with Cardiology in 1 week    DISCHARGE PROBLEM LIST  Active Problems:    CAD (coronary artery disease) POA: Yes    Arterial thrombosis (CMS-HCC) POA: Yes    Chronic back pain POA: Yes  Resolved Problems:    Cellulitis POA: Yes      FOLLOW UP  No future appointments.  No follow-up provider specified.    MEDICATIONS ON DISCHARGE   Linden Hathaway   Home Medication Instructions AVERY:[id number]    Printed on:10/24/17 4435   Medication  Information                      ARGININE PO  Take 1 Tab by mouth every day.             aspirin EC 81 MG EC tablet  Take 81 mg by mouth every morning.             B Complex Vitamins (VITAMIN-B COMPLEX) Tab  Take 1 Tab by mouth every day.             carvedilol (COREG) 6.25 MG Tab  Take 1 Tab by mouth 2 times a day, with meals.             cephALEXin (KEFLEX) 500 MG Cap  Take 1 Cap by mouth every 12 hours for 3 days.             Cholecalciferol (VITAMIN D PO)  Take 1 Tab by mouth every day.             cyanocobalamin (VITAMIN B-12) 100 MCG Tab  Take 100 mcg by mouth every day.             Krill Oil 1000 MG Cap  Take 1 Cap by mouth every day.             morphine ER (MS CONTIN) 15 MG Tab CR tablet  Take 15 mg by mouth at bedtime as needed (severe pain).             nicotine (NICODERM) 21 MG/24HR PATCH 24 HR  Apply 1 Patch to skin as directed every 24 hours.             oxycodone immediate release (ROXICODONE) 10 MG immediate release tablet  Take 1 Tab by mouth 4 times a day as needed for Moderate Pain.             prasugrel (EFFIENT) 10 MG Tab  Take 1 Tab by mouth every day.             spironolactone (ALDACTONE) 25 MG Tab  Take 0.5 Tabs by mouth every day.                 DIET  No orders of the defined types were placed in this encounter.      ACTIVITY  As tolerated.  Weight bearing as tolerated      CONSULTATIONS  Vascular surgery     PROCEDURES  None    UE ART DUPLX/IMAG (Specify in Comments Left, Right Or Bilateral)   Final Result            LABORATORY  Lab Results   Component Value Date/Time    SODIUM 140 10/24/2017 03:51 AM    POTASSIUM 4.1 10/24/2017 03:51 AM    CHLORIDE 111 10/24/2017 03:51 AM    CO2 22 10/24/2017 03:51 AM    GLUCOSE 101 (H) 10/24/2017 03:51 AM    BUN 14 10/24/2017 03:51 AM    CREATININE 0.67 10/24/2017 03:51 AM        Lab Results   Component Value Date/Time    WBC 11.7 (H) 10/24/2017 03:51 AM    HEMOGLOBIN 15.0 10/24/2017 03:51 AM    HEMATOCRIT 44.0 10/24/2017 03:51 AM    PLATELETCT  241 10/24/2017 03:51 AM        Total time of the discharge process exceeds 36 minutes

## 2017-10-26 LAB
BACTERIA BLD CULT: NORMAL
BACTERIA BLD CULT: NORMAL
SIGNIFICANT IND 70042: NORMAL
SIGNIFICANT IND 70042: NORMAL
SITE SITE: NORMAL
SITE SITE: NORMAL
SOURCE SOURCE: NORMAL
SOURCE SOURCE: NORMAL

## 2017-11-02 NOTE — ADDENDUM NOTE
Encounter addended by: Elizabeth Gamez R.N. on: 11/1/2017  5:30 PM<BR>    Actions taken: Flowsheet accepted

## 2018-11-29 NOTE — ASSESSMENT & PLAN NOTE
Improving  Continue keflex  
Pain uncontrolled  Continue home regimen  Morphine for breakthrough pain  
Right radial artery thrombus from the mid forearm to the wrist likely secondary to recent cardiac catheterization  No evidence of limb ischemia  Evaluated by vascular surgery in the ER who recommended no urgent intervention  Continue heparin drip, monitor APTT and adjust per protocol  Continue dual antiplatelets  Awaiting further recommendations from vascular surgery    
Stable, no active chest pain  Continue aspirin and effient  Continue statin  
or rn

## 2021-03-03 DIAGNOSIS — Z23 NEED FOR VACCINATION: ICD-10-CM

## 2023-07-03 PROBLEM — G56.00 CARPAL TUNNEL SYNDROME: Status: ACTIVE | Noted: 2023-07-03

## 2023-07-03 PROBLEM — M19.031 OSTEOARTHRITIS OF RIGHT WRIST: Status: ACTIVE | Noted: 2023-07-03

## 2023-07-03 PROBLEM — G56.03 CARPAL TUNNEL SYNDROME ON BOTH SIDES: Status: ACTIVE | Noted: 2023-07-03

## 2023-07-03 PROBLEM — M19.032 OSTEOARTHRITIS OF LEFT WRIST: Status: ACTIVE | Noted: 2023-07-03
